# Patient Record
Sex: FEMALE | Race: WHITE | Employment: FULL TIME | URBAN - METROPOLITAN AREA
[De-identification: names, ages, dates, MRNs, and addresses within clinical notes are randomized per-mention and may not be internally consistent; named-entity substitution may affect disease eponyms.]

---

## 2017-08-30 ENCOUNTER — OFFICE VISIT (OUTPATIENT)
Dept: FAMILY MEDICINE CLINIC | Facility: CLINIC | Age: 37
End: 2017-08-30

## 2017-08-30 VITALS
DIASTOLIC BLOOD PRESSURE: 80 MMHG | HEIGHT: 62 IN | HEART RATE: 67 BPM | OXYGEN SATURATION: 97 % | WEIGHT: 145 LBS | RESPIRATION RATE: 14 BRPM | TEMPERATURE: 99 F | SYSTOLIC BLOOD PRESSURE: 122 MMHG | BODY MASS INDEX: 26.68 KG/M2

## 2017-08-30 DIAGNOSIS — L72.9 CYST OF SKIN: Primary | ICD-10-CM

## 2017-08-30 PROCEDURE — 99212 OFFICE O/P EST SF 10 MIN: CPT | Performed by: FAMILY MEDICINE

## 2017-08-30 NOTE — PROGRESS NOTES
HPI:    Patient ID: Clari Steward is a 31year old female. Patient presents with:  Lump    HPI  C/o pea size bump between breasts. Location: lower sternum, under bra line  Onset: 6 days ago. Resolved after warm compresses. Felt incidentally.  Was never supraclavicular adenopathy present. Skin: No rash noted. Vitals reviewed. Blood pressure 122/80, pulse 67, temperature 98.7 °F (37.1 °C), temperature source Temporal, resp.  rate 14, height 62\", weight 145 lb, last menstrual period 08/08/2017, SpO2

## 2017-11-13 ENCOUNTER — TELEPHONE (OUTPATIENT)
Dept: FAMILY MEDICINE CLINIC | Facility: CLINIC | Age: 37
End: 2017-11-13

## 2017-11-13 ENCOUNTER — OFFICE VISIT (OUTPATIENT)
Dept: FAMILY MEDICINE CLINIC | Facility: CLINIC | Age: 37
End: 2017-11-13

## 2017-11-13 VITALS
HEIGHT: 62 IN | BODY MASS INDEX: 27.23 KG/M2 | SYSTOLIC BLOOD PRESSURE: 122 MMHG | RESPIRATION RATE: 18 BRPM | DIASTOLIC BLOOD PRESSURE: 80 MMHG | WEIGHT: 148 LBS | HEART RATE: 80 BPM | TEMPERATURE: 98 F

## 2017-11-13 DIAGNOSIS — N63.0 BREAST MASS: Primary | ICD-10-CM

## 2017-11-13 DIAGNOSIS — N63.0 BREAST LUMP IN LOWER INNER QUADRANT: Primary | ICD-10-CM

## 2017-11-13 PROCEDURE — 99213 OFFICE O/P EST LOW 20 MIN: CPT | Performed by: FAMILY MEDICINE

## 2017-11-13 NOTE — TELEPHONE ENCOUNTER
Pt called, the cyst in her breast came back and pt would like to discuss with a nurse.    Please call pt at [phone]

## 2017-11-13 NOTE — TELEPHONE ENCOUNTER
Patient states she tried to schedule her u/s of breast.  Hunter Estrada from breast health told her an order for diagnostic bilateral mammogram would be needed before they can do an u/s. Ordered pended.   Please advise-  Patient would like a call back when this has

## 2017-11-13 NOTE — PROGRESS NOTES
HPI:    Patient ID: Nena Metz is a 30year old female. Patient presents with:  Bump: breast    HPI   Bump noticed by patient on right breast approx 10 days ago. No change in size. No drainage. Not painful. No skin changes.   Had similar bump over st hepatosplenomegaly. There is no tenderness. Lymphadenopathy:     She has no axillary adenopathy. Right: No supraclavicular adenopathy present. Left: No supraclavicular adenopathy present. Skin: No rash noted. Vitals reviewed.     Blood p

## 2017-11-13 NOTE — TELEPHONE ENCOUNTER
Patient states chest was sore on the 3rd. Patient states area was tender and bruised. Area was swollen - patient states it is no longer was swollen. Patient states she put ice on it. Patient states she has been icing it.   Patient states it is pea sized

## 2017-11-14 NOTE — TELEPHONE ENCOUNTER
Informed patient Dr. Ashutosh Franklin is not in the office today. Will call tomorrow once order has been placed. Patient verbalized understanding.

## 2017-11-14 NOTE — TELEPHONE ENCOUNTER
Pt called lvm stated she called yesterday about her diagnostic mammogram and did not hear anything back. Pt stated she needs to get this scheduled.

## 2017-11-21 ENCOUNTER — HOSPITAL ENCOUNTER (OUTPATIENT)
Dept: MAMMOGRAPHY | Age: 37
Discharge: HOME | End: 2017-11-21
Attending: FAMILY MEDICINE
Payer: COMMERCIAL

## 2017-11-21 ENCOUNTER — HOSPITAL ENCOUNTER (OUTPATIENT)
Dept: ULTRASOUND IMAGING | Age: 37
Discharge: HOME | End: 2017-11-21
Attending: FAMILY MEDICINE
Payer: COMMERCIAL

## 2017-11-21 DIAGNOSIS — N63.0 BREAST MASS: ICD-10-CM

## 2017-11-21 DIAGNOSIS — N63.0 BREAST LUMP IN LOWER INNER QUADRANT: ICD-10-CM

## 2017-11-21 PROCEDURE — 77062 BREAST TOMOSYNTHESIS BI: CPT | Performed by: FAMILY MEDICINE

## 2017-11-21 PROCEDURE — 76642 ULTRASOUND BREAST LIMITED: CPT | Performed by: FAMILY MEDICINE

## 2017-11-21 PROCEDURE — 77066 DX MAMMO INCL CAD BI: CPT | Performed by: FAMILY MEDICINE

## 2017-11-27 ENCOUNTER — TELEPHONE (OUTPATIENT)
Dept: FAMILY MEDICINE CLINIC | Facility: CLINIC | Age: 37
End: 2017-11-27

## 2017-11-27 NOTE — TELEPHONE ENCOUNTER
LMTCB to discuss results. Right breast u/s: In the area of palpable abnormality at the 3:00 position of the right breast is a small persistent nodular density.  At the targeted right breast sonography at the 3:00 position, 7 cm from the nipple, there is

## 2017-11-28 ENCOUNTER — NURSE ONLY (OUTPATIENT)
Dept: FAMILY MEDICINE CLINIC | Facility: CLINIC | Age: 37
End: 2017-11-28

## 2017-11-28 DIAGNOSIS — Z00.00 GENERAL MEDICAL EXAM: Primary | ICD-10-CM

## 2017-11-28 PROCEDURE — 82306 VITAMIN D 25 HYDROXY: CPT | Performed by: FAMILY MEDICINE

## 2017-11-28 PROCEDURE — 85025 COMPLETE CBC W/AUTO DIFF WBC: CPT | Performed by: FAMILY MEDICINE

## 2017-11-28 PROCEDURE — 80061 LIPID PANEL: CPT | Performed by: FAMILY MEDICINE

## 2017-11-28 PROCEDURE — 36415 COLL VENOUS BLD VENIPUNCTURE: CPT | Performed by: FAMILY MEDICINE

## 2017-11-28 PROCEDURE — 80053 COMPREHEN METABOLIC PANEL: CPT | Performed by: FAMILY MEDICINE

## 2017-11-28 PROCEDURE — 84443 ASSAY THYROID STIM HORMONE: CPT | Performed by: FAMILY MEDICINE

## 2017-12-01 ENCOUNTER — TELEPHONE (OUTPATIENT)
Dept: FAMILY MEDICINE CLINIC | Facility: CLINIC | Age: 37
End: 2017-12-01

## 2018-01-05 ENCOUNTER — OFFICE VISIT (OUTPATIENT)
Dept: FAMILY MEDICINE CLINIC | Facility: CLINIC | Age: 38
End: 2018-01-05

## 2018-01-05 VITALS
OXYGEN SATURATION: 98 % | HEART RATE: 80 BPM | WEIGHT: 147 LBS | TEMPERATURE: 98 F | BODY MASS INDEX: 25.72 KG/M2 | RESPIRATION RATE: 18 BRPM | SYSTOLIC BLOOD PRESSURE: 104 MMHG | HEIGHT: 63.2 IN | DIASTOLIC BLOOD PRESSURE: 70 MMHG

## 2018-01-05 DIAGNOSIS — Z00.00 ANNUAL PHYSICAL EXAM: Primary | ICD-10-CM

## 2018-01-05 DIAGNOSIS — L70.9 ADULT ACNE: ICD-10-CM

## 2018-01-05 DIAGNOSIS — Z23 NEED FOR VACCINATION: ICD-10-CM

## 2018-01-05 PROCEDURE — 90471 IMMUNIZATION ADMIN: CPT | Performed by: FAMILY MEDICINE

## 2018-01-05 PROCEDURE — 90686 IIV4 VACC NO PRSV 0.5 ML IM: CPT | Performed by: FAMILY MEDICINE

## 2018-01-05 PROCEDURE — 99395 PREV VISIT EST AGE 18-39: CPT | Performed by: FAMILY MEDICINE

## 2018-01-05 RX ORDER — CLINDAMYCIN AND BENZOYL PEROXIDE 10; 50 MG/G; MG/G
GEL TOPICAL
Qty: 45 G | Refills: 0 | Status: CANCELLED | OUTPATIENT
Start: 2018-01-05

## 2018-01-05 RX ORDER — MOMETASONE FUROATE 1 MG/G
CREAM TOPICAL
Qty: 50 G | Refills: 0 | Status: CANCELLED | OUTPATIENT
Start: 2018-01-05

## 2018-01-05 RX ORDER — SCOLOPAMINE TRANSDERMAL SYSTEM 1 MG/1
1 PATCH, EXTENDED RELEASE TRANSDERMAL
Qty: 4 PATCH | Refills: 0 | Status: SHIPPED | OUTPATIENT
Start: 2018-01-05 | End: 2019-05-17

## 2018-01-05 NOTE — PROGRESS NOTES
HPI:   Sherri Hernandez is a 30year old female who presents for a complete physical exam. Needs form for work filled out. No concerns today. UTD on pap. Needs referral for derm, Dr. Moises Gilliam, for acne and skin check.     Gets motion sickness and request Maternal Grandmother      breast cancer   • Breast Cancer Maternal Grandmother 39     approx age    • Heart Disease Neg    • Stroke Neg    • Diabetes Neg    • Heart Disorder Neg       Social History:   Smoking status: Never Smoker Derm referral placed. Motion sickness: scopolamine patch. Side effects and therapeutic benefits of medication reviewed. Patient expresses understanding. Pt' s weight is Body mass index is 25.88 kg/m². , continue regular exercise.   The patient indicates

## 2018-01-08 RX ORDER — CLINDAMYCIN AND BENZOYL PEROXIDE 10; 50 MG/G; MG/G
GEL TOPICAL
Qty: 45 G | Refills: 1 | Status: CANCELLED
Start: 2018-01-08

## 2018-01-08 RX ORDER — MOMETASONE FUROATE 1 MG/G
CREAM TOPICAL
Qty: 50 G | Refills: 0 | Status: CANCELLED
Start: 2018-01-08

## 2018-01-08 NOTE — TELEPHONE ENCOUNTER
LOV: 1/5/18 for annual physical  Patient has eczema and adult acne    Mometasone Furoate (ELOCON) 0.1 % Apply Externally Cream 50 g 0 9/5/2015    Sig :  Apply sparingly to affected area BID for 7 days     Route:   (none)       Clindamycin Phos-Benzoyl Dwain

## 2018-01-08 NOTE — TELEPHONE ENCOUNTER
From: Stefania Chahal  Sent: 1/8/2018 9:00 AM CST  Subject: Medication Renewal Request    Zahida Halsted.  Kinsey would like a refill of the following medications:     Mometasone Furoate (ELOCON) 0.1 % Apply Externally Cream Shashank Shin MD]     Clindamycin Phos

## 2019-02-01 ENCOUNTER — OFFICE VISIT (OUTPATIENT)
Dept: FAMILY MEDICINE CLINIC | Facility: CLINIC | Age: 39
End: 2019-02-01

## 2019-02-01 VITALS
RESPIRATION RATE: 16 BRPM | BODY MASS INDEX: 26.67 KG/M2 | SYSTOLIC BLOOD PRESSURE: 116 MMHG | WEIGHT: 148.63 LBS | HEIGHT: 62.5 IN | TEMPERATURE: 98 F | HEART RATE: 68 BPM | DIASTOLIC BLOOD PRESSURE: 80 MMHG

## 2019-02-01 DIAGNOSIS — K21.9 GASTROESOPHAGEAL REFLUX DISEASE, ESOPHAGITIS PRESENCE NOT SPECIFIED: Primary | ICD-10-CM

## 2019-02-01 LAB
BASOPHILS # BLD AUTO: 0.05 X10(3) UL (ref 0–0.2)
BASOPHILS NFR BLD AUTO: 0.6 %
DEPRECATED RDW RBC AUTO: 40.4 FL (ref 35.1–46.3)
EOSINOPHIL # BLD AUTO: 1.04 X10(3) UL (ref 0–0.7)
EOSINOPHIL NFR BLD AUTO: 12.6 %
ERYTHROCYTE [DISTWIDTH] IN BLOOD BY AUTOMATED COUNT: 12.4 % (ref 11–15)
HCT VFR BLD AUTO: 41 % (ref 35–48)
HGB BLD-MCNC: 13.2 G/DL (ref 12–16)
IMM GRANULOCYTES # BLD AUTO: 0.01 X10(3) UL (ref 0–1)
IMM GRANULOCYTES NFR BLD: 0.1 %
LYMPHOCYTES # BLD AUTO: 2 X10(3) UL (ref 1–4)
LYMPHOCYTES NFR BLD AUTO: 24.2 %
MCH RBC QN AUTO: 28.8 PG (ref 26–34)
MCHC RBC AUTO-ENTMCNC: 32.2 G/DL (ref 31–37)
MCV RBC AUTO: 89.5 FL (ref 80–100)
MONOCYTES # BLD AUTO: 0.5 X10(3) UL (ref 0.1–1)
MONOCYTES NFR BLD AUTO: 6.1 %
NEUTROPHILS # BLD AUTO: 4.66 X10 (3) UL (ref 1.5–7.7)
NEUTROPHILS # BLD AUTO: 4.66 X10(3) UL (ref 1.5–7.7)
NEUTROPHILS NFR BLD AUTO: 56.4 %
PLATELET # BLD AUTO: 204 10(3)UL (ref 150–450)
RBC # BLD AUTO: 4.58 X10(6)UL (ref 3.8–5.3)
WBC # BLD AUTO: 8.3 X10(3) UL (ref 4–11)

## 2019-02-01 PROCEDURE — 36415 COLL VENOUS BLD VENIPUNCTURE: CPT | Performed by: FAMILY MEDICINE

## 2019-02-01 PROCEDURE — 85025 COMPLETE CBC W/AUTO DIFF WBC: CPT | Performed by: FAMILY MEDICINE

## 2019-02-01 PROCEDURE — 99214 OFFICE O/P EST MOD 30 MIN: CPT | Performed by: FAMILY MEDICINE

## 2019-02-01 RX ORDER — OMEPRAZOLE 40 MG/1
40 CAPSULE, DELAYED RELEASE ORAL DAILY
Qty: 30 CAPSULE | Refills: 0 | Status: SHIPPED | OUTPATIENT
Start: 2019-02-01 | End: 2019-05-17

## 2019-02-01 NOTE — PROGRESS NOTES
HPI:    Patient ID: Jordyn Hill is a 41year old female. Patient presents with:  Stomach Pain    HPI  C/o epigastric pain for 1 week. Non-radiating. Sharp pain. Aggravated with all foods. Day and night. Acidic taste in mouth today. Also feel gasy.  No Vitals reviewed. Blood pressure 116/80, pulse 68, temperature 97.8 °F (36.6 °C), temperature source Temporal, resp. rate 16, height 62.5\", weight 148 lb 9.6 oz, last menstrual period 01/08/2019, not currently breastfeeding.              ASSESSMENT/JERRICA

## 2019-02-04 ENCOUNTER — TELEPHONE (OUTPATIENT)
Dept: FAMILY MEDICINE CLINIC | Facility: CLINIC | Age: 39
End: 2019-02-04

## 2019-03-31 ENCOUNTER — PATIENT MESSAGE (OUTPATIENT)
Dept: FAMILY MEDICINE CLINIC | Facility: CLINIC | Age: 39
End: 2019-03-31

## 2019-04-01 NOTE — TELEPHONE ENCOUNTER
Patient last seen 2/1/19 GERD  Medication prescribed and previously filled by Dr. Ruby Guerrero  Patient has not followed up with Dr. Ruby Guerrero and requesting Dr. Kraig Smith to refill. Medication pended - please advise if we can refill.

## 2019-04-01 NOTE — TELEPHONE ENCOUNTER
From: Sabina Ludwig  To: Bharath Moore MD  Sent: 3/31/2019 4:25 PM CDT  Subject: Prescription Question    I am in need of a refill of my Sulfacetamide Sodium (Acne) 10 % Lotn.  However since I have not seen Dr Arian Dahl yet for my annual check she is unable

## 2019-04-02 RX ORDER — SULFACETAMIDE SODIUM 100 MG/ML
LOTION TOPICAL
Qty: 118 ML | Refills: 0 | Status: SHIPPED | OUTPATIENT
Start: 2019-04-02 | End: 2019-05-17

## 2019-05-17 ENCOUNTER — OFFICE VISIT (OUTPATIENT)
Dept: FAMILY MEDICINE CLINIC | Facility: CLINIC | Age: 39
End: 2019-05-17

## 2019-05-17 VITALS
DIASTOLIC BLOOD PRESSURE: 62 MMHG | TEMPERATURE: 98 F | RESPIRATION RATE: 20 BRPM | OXYGEN SATURATION: 99 % | BODY MASS INDEX: 26.19 KG/M2 | HEART RATE: 80 BPM | WEIGHT: 146 LBS | SYSTOLIC BLOOD PRESSURE: 98 MMHG | HEIGHT: 62.5 IN

## 2019-05-17 DIAGNOSIS — L70.9 ACNE, UNSPECIFIED ACNE TYPE: Primary | ICD-10-CM

## 2019-05-17 DIAGNOSIS — Z12.4 PAP SMEAR FOR CERVICAL CANCER SCREENING: ICD-10-CM

## 2019-05-17 DIAGNOSIS — Z00.00 ANNUAL PHYSICAL EXAM: ICD-10-CM

## 2019-05-17 DIAGNOSIS — D22.9 ATYPICAL MOLE: ICD-10-CM

## 2019-05-17 PROCEDURE — 80053 COMPREHEN METABOLIC PANEL: CPT | Performed by: FAMILY MEDICINE

## 2019-05-17 PROCEDURE — 87624 HPV HI-RISK TYP POOLED RSLT: CPT | Performed by: FAMILY MEDICINE

## 2019-05-17 PROCEDURE — 88175 CYTOPATH C/V AUTO FLUID REDO: CPT | Performed by: FAMILY MEDICINE

## 2019-05-17 PROCEDURE — 83036 HEMOGLOBIN GLYCOSYLATED A1C: CPT | Performed by: FAMILY MEDICINE

## 2019-05-17 PROCEDURE — 80061 LIPID PANEL: CPT | Performed by: FAMILY MEDICINE

## 2019-05-17 PROCEDURE — 85025 COMPLETE CBC W/AUTO DIFF WBC: CPT | Performed by: FAMILY MEDICINE

## 2019-05-17 PROCEDURE — 84443 ASSAY THYROID STIM HORMONE: CPT | Performed by: FAMILY MEDICINE

## 2019-05-17 PROCEDURE — 99395 PREV VISIT EST AGE 18-39: CPT | Performed by: FAMILY MEDICINE

## 2019-05-17 RX ORDER — SCOLOPAMINE TRANSDERMAL SYSTEM 1 MG/1
1 PATCH, EXTENDED RELEASE TRANSDERMAL
Qty: 4 PATCH | Refills: 0 | Status: SHIPPED | OUTPATIENT
Start: 2019-05-17

## 2019-05-17 RX ORDER — SULFACETAMIDE SODIUM 100 MG/ML
LOTION TOPICAL
Qty: 118 ML | Refills: 0 | Status: SHIPPED | OUTPATIENT
Start: 2019-05-17 | End: 2020-01-02

## 2019-05-17 RX ORDER — MOMETASONE FUROATE 1 MG/G
CREAM TOPICAL
Qty: 50 G | Refills: 1 | Status: SHIPPED | OUTPATIENT
Start: 2019-05-17 | End: 2020-10-19

## 2019-05-17 NOTE — PROGRESS NOTES
HPI:   Danyell Jackson is a 41year old female who presents for a complete physical exam.   No concerns today. Needs form for work. Pap due    GERD sx from Feb resolved.     Wt Readings from Last 6 Encounters:  05/17/19 : 146 lb  02/01/19 : 148 lb 9.6 oz  0 Grandmother         breast cancer   • Breast Cancer Maternal Grandmother 39        approx age    • Heart Disease Neg    • Stroke Neg    • Diabetes Neg    • Heart Disorder Neg       Social History:   Social History    Tobacco Use      Smoking status: Former tenderness  :introitus is normal,scant discharge,cervix is pink,no adnexal masses or tenderness, PAP was done   MUSCULOSKELETAL: back is not tender  EXTREMITIES: no edema  NEURO: Cranial nerves are intact,motor and sensory are grossly intact  PSYCH: mood

## 2020-01-02 RX ORDER — SULFACETAMIDE SODIUM 100 MG/ML
LOTION TOPICAL
Qty: 118 ML | Refills: 0 | Status: SHIPPED | OUTPATIENT
Start: 2020-01-02 | End: 2021-11-12

## 2020-01-02 NOTE — TELEPHONE ENCOUNTER
Last refilled on 5/17/19 for # 118mL with 0 refills  Last OV 5/17/19  No future appointments. Thank you.

## 2020-03-18 ENCOUNTER — E-VISIT (OUTPATIENT)
Dept: FAMILY MEDICINE CLINIC | Facility: CLINIC | Age: 40
End: 2020-03-18

## 2020-03-18 DIAGNOSIS — J02.9 SORE THROAT: Primary | ICD-10-CM

## 2020-03-18 DIAGNOSIS — Z20.818 STREP THROAT EXPOSURE: ICD-10-CM

## 2020-03-18 RX ORDER — AMOXICILLIN 875 MG/1
875 TABLET, COATED ORAL 2 TIMES DAILY
Qty: 20 TABLET | Refills: 0 | Status: SHIPPED | OUTPATIENT
Start: 2020-03-18 | End: 2020-03-28

## 2020-03-18 NOTE — PROGRESS NOTES
Linda Orr is a 42year old female. HPI:   See answers to questions above. Current Outpatient Medications   Medication Sig Dispense Refill   • amoxicillin 875 MG Oral Tab Take 1 tablet (875 mg total) by mouth 2 (two) times daily for 10 days.  20 ta specific patient instructions

## 2020-05-18 ENCOUNTER — E-VISIT (OUTPATIENT)
Dept: FAMILY MEDICINE CLINIC | Facility: CLINIC | Age: 40
End: 2020-05-18

## 2020-05-18 DIAGNOSIS — K21.9 GASTROESOPHAGEAL REFLUX DISEASE, ESOPHAGITIS PRESENCE NOT SPECIFIED: Primary | ICD-10-CM

## 2020-05-18 PROCEDURE — 99421 OL DIG E/M SVC 5-10 MIN: CPT | Performed by: NURSE PRACTITIONER

## 2020-05-18 NOTE — PROGRESS NOTES
Clari Steward is a 42year old female. HPI:   See answers to questions above.      Current Outpatient Medications   Medication Sig Dispense Refill   • Sulfacetamide Sodium, Acne, 10 % External Lotion APPLY EXTERNALLY TO FACE EVERY MORNING 118 mL 0   • Mom

## 2020-10-19 RX ORDER — MOMETASONE FUROATE 1 MG/G
CREAM TOPICAL
Qty: 45 G | Refills: 0 | Status: SHIPPED | OUTPATIENT
Start: 2020-10-19 | End: 2021-11-12

## 2020-10-19 NOTE — TELEPHONE ENCOUNTER
LOV 5/17/19 for acne.     Future Appointments   Date Time Provider Odalys Mohan   10/21/2020  9:00 AM Miriam Keller MD [facility] [place]     Mometasone Furoate 0.1 % External Cream 50 g 1 5/17/2019    Sig:   Apply sparingly to affected area BID for 7

## 2020-10-21 ENCOUNTER — OFFICE VISIT (OUTPATIENT)
Dept: FAMILY MEDICINE CLINIC | Facility: CLINIC | Age: 40
End: 2020-10-21

## 2020-10-21 VITALS
HEART RATE: 64 BPM | DIASTOLIC BLOOD PRESSURE: 74 MMHG | HEIGHT: 63 IN | WEIGHT: 152 LBS | RESPIRATION RATE: 18 BRPM | OXYGEN SATURATION: 99 % | SYSTOLIC BLOOD PRESSURE: 116 MMHG | TEMPERATURE: 98 F | BODY MASS INDEX: 26.93 KG/M2

## 2020-10-21 DIAGNOSIS — Z80.0 FHX: COLON CANCER: ICD-10-CM

## 2020-10-21 DIAGNOSIS — Z23 NEED FOR VACCINATION: Primary | ICD-10-CM

## 2020-10-21 DIAGNOSIS — Z00.00 ANNUAL PHYSICAL EXAM: ICD-10-CM

## 2020-10-21 DIAGNOSIS — L70.9 ACNE, UNSPECIFIED ACNE TYPE: ICD-10-CM

## 2020-10-21 PROCEDURE — 90686 IIV4 VACC NO PRSV 0.5 ML IM: CPT | Performed by: FAMILY MEDICINE

## 2020-10-21 PROCEDURE — 80053 COMPREHEN METABOLIC PANEL: CPT | Performed by: FAMILY MEDICINE

## 2020-10-21 PROCEDURE — 85025 COMPLETE CBC W/AUTO DIFF WBC: CPT | Performed by: FAMILY MEDICINE

## 2020-10-21 PROCEDURE — 99395 PREV VISIT EST AGE 18-39: CPT | Performed by: FAMILY MEDICINE

## 2020-10-21 PROCEDURE — 83036 HEMOGLOBIN GLYCOSYLATED A1C: CPT | Performed by: FAMILY MEDICINE

## 2020-10-21 PROCEDURE — 3008F BODY MASS INDEX DOCD: CPT | Performed by: FAMILY MEDICINE

## 2020-10-21 PROCEDURE — 80061 LIPID PANEL: CPT | Performed by: FAMILY MEDICINE

## 2020-10-21 PROCEDURE — 3078F DIAST BP <80 MM HG: CPT | Performed by: FAMILY MEDICINE

## 2020-10-21 PROCEDURE — 90471 IMMUNIZATION ADMIN: CPT | Performed by: FAMILY MEDICINE

## 2020-10-21 PROCEDURE — 3074F SYST BP LT 130 MM HG: CPT | Performed by: FAMILY MEDICINE

## 2020-10-21 PROCEDURE — 84443 ASSAY THYROID STIM HORMONE: CPT | Performed by: FAMILY MEDICINE

## 2020-10-21 NOTE — PROGRESS NOTES
HPI:   Ewelina Trammell is a 42year old female who presents for a complete physical exam.  No concerns today. Needs form for work.   Pap: UTD  Recently found out GM had colon cancer in 45s    Wt Readings from Last 6 Encounters:  10/21/20 : 152 lb (68.9 kg) History   Problem Relation Age of Onset   • Hypertension Maternal Grandmother    • Lipids Maternal Grandmother    • Cancer Maternal Grandmother         breast cancer   • Breast Cancer Maternal Grandmother 39        approx age    • Heart Disease Neg    • St clear to auscultation  CARDIO: RRR without murmur  GI: soft, good BS's,no masses, HSM or tenderness  MUSCULOSKELETAL: back is not tender  EXTREMITIES: no edema  NEURO: Cranial nerves are intact,motor and sensory are grossly intact  PSYCH: mood, thought, be

## 2020-12-06 ENCOUNTER — APPOINTMENT (OUTPATIENT)
Dept: LAB | Facility: HOSPITAL | Age: 40
End: 2020-12-06
Attending: INTERNAL MEDICINE
Payer: COMMERCIAL

## 2020-12-06 DIAGNOSIS — Z01.818 PRE-OP TESTING: ICD-10-CM

## 2020-12-09 PROBLEM — R19.4 CHANGE IN BOWEL HABITS: Status: ACTIVE | Noted: 2020-12-09

## 2020-12-09 PROBLEM — R10.13 ABDOMINAL PAIN, EPIGASTRIC: Status: ACTIVE | Noted: 2020-12-09

## 2020-12-09 PROBLEM — R68.81 EARLY SATIETY: Status: ACTIVE | Noted: 2020-12-09

## 2020-12-09 PROBLEM — R14.1 ABDOMINAL GAS PAIN: Status: ACTIVE | Noted: 2020-12-09

## 2020-12-09 PROCEDURE — 88305 TISSUE EXAM BY PATHOLOGIST: CPT | Performed by: INTERNAL MEDICINE

## 2021-01-04 RX ORDER — MOMETASONE FUROATE 1 MG/G
CREAM TOPICAL
Qty: 45 G | Refills: 0 | OUTPATIENT
Start: 2021-01-04

## 2021-04-01 DIAGNOSIS — Z23 NEED FOR VACCINATION: ICD-10-CM

## 2021-06-08 NOTE — PROGRESS NOTES
"SPIRITUAL HEALTH SERVICES  SPIRITUAL ASSESSMENT Progress Note  [facility]     REFERRAL SOURCE: Pt request at Admission    I briefly visited Aspen today as there was a request for a spiritual health visit. Aspen shared she didn't request this and shared \"I am one of Jehovah's witnesses.\" She declined any needs at this time and I offered words of support.     PLAN: No plans for follow up but spiritual health remains available if Aspen's needs change.     Roseanne Alvarado  Associate    Phone: [phone]  Pager: [phone]    " Med refilled.

## 2021-09-08 ENCOUNTER — PATIENT MESSAGE (OUTPATIENT)
Dept: FAMILY MEDICINE CLINIC | Facility: CLINIC | Age: 41
End: 2021-09-08

## 2021-09-09 NOTE — TELEPHONE ENCOUNTER
From: Unknown Diana  To: Zachary Quintero MD  Sent: 9/8/2021 7:21 PM CDT  Subject: Non-Urgent Medical Question    Good evening. Since 2016 I have experienced intermittent spotting between periods, mostly correlating with ovulation.  I had a ultrasound in 201

## 2021-09-09 NOTE — TELEPHONE ENCOUNTER
Future Appointments   Date Time Provider Odalys Mohan   9/15/2021  1:30 PM Shahram Henley MD [facility] [place]     Wait for OV so approp test can be ordered after eval

## 2021-09-14 ENCOUNTER — PATIENT MESSAGE (OUTPATIENT)
Dept: FAMILY MEDICINE CLINIC | Facility: CLINIC | Age: 41
End: 2021-09-14

## 2021-09-15 ENCOUNTER — OFFICE VISIT (OUTPATIENT)
Dept: FAMILY MEDICINE CLINIC | Facility: CLINIC | Age: 41
End: 2021-09-15

## 2021-09-15 VITALS
HEART RATE: 67 BPM | BODY MASS INDEX: 27.6 KG/M2 | SYSTOLIC BLOOD PRESSURE: 102 MMHG | TEMPERATURE: 98 F | HEIGHT: 62 IN | RESPIRATION RATE: 18 BRPM | WEIGHT: 150 LBS | DIASTOLIC BLOOD PRESSURE: 60 MMHG | OXYGEN SATURATION: 95 %

## 2021-09-15 DIAGNOSIS — N92.3 INTERMENSTRUAL SPOTTING: Primary | ICD-10-CM

## 2021-09-15 LAB
BASOPHILS # BLD AUTO: 0.03 X10(3) UL (ref 0–0.2)
BASOPHILS NFR BLD AUTO: 0.5 %
EOSINOPHIL # BLD AUTO: 0.06 X10(3) UL (ref 0–0.7)
EOSINOPHIL NFR BLD AUTO: 0.9 %
ERYTHROCYTE [DISTWIDTH] IN BLOOD BY AUTOMATED COUNT: 13.1 %
HCT VFR BLD AUTO: 42.2 %
HGB BLD-MCNC: 13.3 G/DL
IMM GRANULOCYTES # BLD AUTO: 0.01 X10(3) UL (ref 0–1)
IMM GRANULOCYTES NFR BLD: 0.2 %
LYMPHOCYTES # BLD AUTO: 2.06 X10(3) UL (ref 1–4)
LYMPHOCYTES NFR BLD AUTO: 32 %
MCH RBC QN AUTO: 27.7 PG (ref 26–34)
MCHC RBC AUTO-ENTMCNC: 31.5 G/DL (ref 31–37)
MCV RBC AUTO: 87.7 FL
MONOCYTES # BLD AUTO: 0.41 X10(3) UL (ref 0.1–1)
MONOCYTES NFR BLD AUTO: 6.4 %
NEUTROPHILS # BLD AUTO: 3.87 X10 (3) UL (ref 1.5–7.7)
NEUTROPHILS # BLD AUTO: 3.87 X10(3) UL (ref 1.5–7.7)
NEUTROPHILS NFR BLD AUTO: 60 %
PLATELET # BLD AUTO: 233 10(3)UL (ref 150–450)
RBC # BLD AUTO: 4.81 X10(6)UL
WBC # BLD AUTO: 6.4 X10(3) UL (ref 4–11)

## 2021-09-15 PROCEDURE — 85025 COMPLETE CBC W/AUTO DIFF WBC: CPT | Performed by: FAMILY MEDICINE

## 2021-09-15 PROCEDURE — 3074F SYST BP LT 130 MM HG: CPT | Performed by: FAMILY MEDICINE

## 2021-09-15 PROCEDURE — 3078F DIAST BP <80 MM HG: CPT | Performed by: FAMILY MEDICINE

## 2021-09-15 PROCEDURE — 99214 OFFICE O/P EST MOD 30 MIN: CPT | Performed by: FAMILY MEDICINE

## 2021-09-15 PROCEDURE — 3008F BODY MASS INDEX DOCD: CPT | Performed by: FAMILY MEDICINE

## 2021-09-15 NOTE — TELEPHONE ENCOUNTER
Future Appointments   Date Time Provider Odalys Kimberlee   9/15/2021  1:30 PM Elena Gonzalez MD [facility]

## 2021-09-15 NOTE — PROGRESS NOTES
Patient presents with:  Irregular Periods: spotting between period        HPI:    Patient ID: Lisa Shea is a 47year old female intermenstrual spotting since 2016.  2 days spotting mid-cycle . Periods reg. Lasts 7 days. Last 3 days very light.   Cycle breast cancer (45s), colon ca (45s)   • Breast Cancer Maternal Grandmother 39        approx age    • Colon Cancer Maternal Grandmother         Had partial resection in 45s   • Crohn's Disease Sister         Sister has a history of IBS   • Heart Disease Neg

## 2021-09-15 NOTE — TELEPHONE ENCOUNTER
From: Catalino Gray  To: Lucy Flanagan MD  Sent: 9/14/2021 7:55 PM CDT  Subject: Confirm ok for telehealth visit     Can you please review the previous correspondence and let me know if a telehealth visit is still ok for tomorrow, or if I need to resched

## 2021-10-06 ENCOUNTER — HOSPITAL ENCOUNTER (OUTPATIENT)
Dept: ULTRASOUND IMAGING | Age: 41
Discharge: HOME | End: 2021-10-06
Attending: FAMILY MEDICINE
Payer: COMMERCIAL

## 2021-10-06 ENCOUNTER — PATIENT MESSAGE (OUTPATIENT)
Dept: FAMILY MEDICINE CLINIC | Facility: CLINIC | Age: 41
End: 2021-10-06

## 2021-10-06 DIAGNOSIS — N92.3 INTERMENSTRUAL SPOTTING: ICD-10-CM

## 2021-10-06 PROCEDURE — 76830 TRANSVAGINAL US NON-OB: CPT | Performed by: FAMILY MEDICINE

## 2021-10-06 PROCEDURE — 76856 US EXAM PELVIC COMPLETE: CPT | Performed by: FAMILY MEDICINE

## 2021-10-06 NOTE — TELEPHONE ENCOUNTER
From: Devon [place]  To: Lilliana Zamarripa MD  Sent: 10/6/2021 2:08 PM CDT  Subject: Question regarding Ultrasound Scan Pelvis    The US was completed but it does not make mention of the nabothian cyst on the cervix which was present previously and still pal

## 2021-10-08 ENCOUNTER — TELEPHONE (OUTPATIENT)
Dept: FAMILY MEDICINE CLINIC | Facility: CLINIC | Age: 41
End: 2021-10-08

## 2021-10-08 NOTE — TELEPHONE ENCOUNTER
Spoke with the pt and advised of the ultrasound results and recommendations she states that she has an appt with Gyne next week  Advised that they have the same computer system and they will be able to see the ultrasound results    She v/u

## 2021-10-08 NOTE — TELEPHONE ENCOUNTER
----- Message from Jeff Saucedo MD sent at 10/7/2021 11:04 PM CDT -----  Ultrasound grossly nl. No obvious cause for intermenstrual bleeding seen. Rec f/u with gyne.  If pt does not have one, refer to [facility] gyne

## 2021-10-11 PROBLEM — N92.3 INTERMENSTRUAL BLEEDING: Status: ACTIVE | Noted: 2021-10-11

## 2021-10-12 ENCOUNTER — OFFICE VISIT (OUTPATIENT)
Dept: OBGYN CLINIC | Facility: CLINIC | Age: 41
End: 2021-10-12

## 2021-10-12 VITALS — HEIGHT: 63.5 IN | BODY MASS INDEX: 26.77 KG/M2 | WEIGHT: 153 LBS

## 2021-10-12 DIAGNOSIS — N92.3 INTERMENSTRUAL BLEEDING: Primary | ICD-10-CM

## 2021-10-12 PROCEDURE — 99243 OFF/OP CNSLTJ NEW/EST LOW 30: CPT | Performed by: OBSTETRICS & GYNECOLOGY

## 2021-10-12 PROCEDURE — 3008F BODY MASS INDEX DOCD: CPT | Performed by: OBSTETRICS & GYNECOLOGY

## 2021-10-12 NOTE — PROGRESS NOTES
[facility]  Obstetrics and Gynecology  Consultation History & Physical    [facility] Patient Status:  No patient class for patient encounter    10/30/1980 MRN [id number]   Location [address] [facility], [address] Attending No a Past Surgical History:   Procedure Laterality Date   •       x2   • MINH BIOPSY STEREO NODULE 1 SITE RIGHT (CPT=19081)  ? benign       Review of Systems:  Pertinent items are noted in the HPI.     Objective:  Ht 63.5\"   Wt 153 lb (69.4 OK with her current bleeding pattern. Recommend office hysteroscopy and endometrial biopsy, as suspicion for pathology is low.   If office hysteroscopy reveals any abnormalities, would need outpatient hysteroscopy to perform endometrial polypectomy and D&C

## 2021-10-27 ENCOUNTER — TELEPHONE (OUTPATIENT)
Dept: FAMILY MEDICINE CLINIC | Facility: CLINIC | Age: 41
End: 2021-10-27

## 2021-10-27 DIAGNOSIS — Z00.00 ANNUAL PHYSICAL EXAM: Primary | ICD-10-CM

## 2021-10-27 NOTE — TELEPHONE ENCOUNTER
Pt wants to have labs done before her px appt 11/10/21,  States they are the same ones every year. Please call pt when orders are placed.      Future Appointments   Date Time Provider Odalys Kimberlee   11/10/2021  3:00 PM Zee Gale MD [facility]

## 2021-11-04 ENCOUNTER — TELEPHONE (OUTPATIENT)
Dept: FAMILY MEDICINE CLINIC | Facility: CLINIC | Age: 41
End: 2021-11-04

## 2021-11-04 NOTE — TELEPHONE ENCOUNTER
Pt scheduled an appt tomorrow with dr Armando Joshua for her annual health screening. Pt needs this before 11/15, pt made for 15 min. Is this ok, or does she need to schedule another appt?   Pt states she just needs her weight her waist measurement and the form mellisa

## 2021-11-04 NOTE — TELEPHONE ENCOUNTER
Spoke with the pt and she was very nice- we were able to get her in the schedule for next week.  She thanked me for the call and getting her in sooner      Future Appointments   Date Time Provider Odalys Mohan   11/11/2021  8:15 AM [facility] [place] NURSE [facility]

## 2021-11-04 NOTE — TELEPHONE ENCOUNTER
Pt stated that if not okay she would like to get just her labs done. Informed pt that the RN are booked for tomorrow but we can see if they can fit you in. Scheduled a RN appt just in case it is not.

## 2021-11-04 NOTE — TELEPHONE ENCOUNTER
Patient scheduled for 15 min px tomorrow with Dr Suzanne Santana. States needs this and health form filled out by 11/15-if this will not work can she just have labs done?  also scheduled her for a nurse visit to have labs. Please advise.  Dr Lulú Jasmine also ful

## 2021-11-11 ENCOUNTER — NURSE ONLY (OUTPATIENT)
Dept: FAMILY MEDICINE CLINIC | Facility: CLINIC | Age: 41
End: 2021-11-11

## 2021-11-11 DIAGNOSIS — Z00.00 ANNUAL PHYSICAL EXAM: ICD-10-CM

## 2021-11-11 PROCEDURE — 85025 COMPLETE CBC W/AUTO DIFF WBC: CPT | Performed by: FAMILY MEDICINE

## 2021-11-11 PROCEDURE — 80053 COMPREHEN METABOLIC PANEL: CPT | Performed by: FAMILY MEDICINE

## 2021-11-11 PROCEDURE — 83036 HEMOGLOBIN GLYCOSYLATED A1C: CPT | Performed by: FAMILY MEDICINE

## 2021-11-11 PROCEDURE — 84443 ASSAY THYROID STIM HORMONE: CPT | Performed by: FAMILY MEDICINE

## 2021-11-11 PROCEDURE — 80061 LIPID PANEL: CPT | Performed by: FAMILY MEDICINE

## 2021-11-11 NOTE — PROGRESS NOTES
Lizzette Duran presents today for nurse visit. Labs ordered by Dr Manda Smith. 1 light green, 1 lavender drawn from right ac area with 1 attempt with straight needle. Patient tolerated well. Left office in stable condition.

## 2021-11-12 ENCOUNTER — TELEPHONE (OUTPATIENT)
Dept: OBGYN CLINIC | Facility: CLINIC | Age: 41
End: 2021-11-12

## 2021-11-12 ENCOUNTER — OFFICE VISIT (OUTPATIENT)
Dept: FAMILY MEDICINE CLINIC | Facility: CLINIC | Age: 41
End: 2021-11-12

## 2021-11-12 VITALS
SYSTOLIC BLOOD PRESSURE: 114 MMHG | OXYGEN SATURATION: 99 % | WEIGHT: 151 LBS | TEMPERATURE: 98 F | HEART RATE: 79 BPM | RESPIRATION RATE: 19 BRPM | DIASTOLIC BLOOD PRESSURE: 80 MMHG | HEIGHT: 63.2 IN | BODY MASS INDEX: 26.42 KG/M2

## 2021-11-12 DIAGNOSIS — L70.9 ADULT ACNE: ICD-10-CM

## 2021-11-12 DIAGNOSIS — Z00.00 ANNUAL PHYSICAL EXAM: Primary | ICD-10-CM

## 2021-11-12 DIAGNOSIS — L30.9 ECZEMA, UNSPECIFIED TYPE: ICD-10-CM

## 2021-11-12 DIAGNOSIS — Z12.31 BREAST CANCER SCREENING BY MAMMOGRAM: ICD-10-CM

## 2021-11-12 PROCEDURE — 3074F SYST BP LT 130 MM HG: CPT | Performed by: FAMILY MEDICINE

## 2021-11-12 PROCEDURE — 3008F BODY MASS INDEX DOCD: CPT | Performed by: FAMILY MEDICINE

## 2021-11-12 PROCEDURE — 3079F DIAST BP 80-89 MM HG: CPT | Performed by: FAMILY MEDICINE

## 2021-11-12 PROCEDURE — 99395 PREV VISIT EST AGE 18-39: CPT | Performed by: FAMILY MEDICINE

## 2021-11-12 RX ORDER — MOMETASONE FUROATE 1 MG/G
CREAM TOPICAL
Qty: 45 G | Refills: 0 | Status: SHIPPED | OUTPATIENT
Start: 2021-11-12

## 2021-11-12 RX ORDER — SULFACETAMIDE SODIUM 100 MG/ML
LOTION TOPICAL
Qty: 118 ML | Refills: 2 | Status: SHIPPED | OUTPATIENT
Start: 2021-11-12

## 2021-11-12 NOTE — TELEPHONE ENCOUNTER
Per review of chart, patient seen in office on 10/12/21. Dr. Isabel Roca recommended in office hysteroscopy and EMB. Will have staff call patient to schedule.

## 2021-11-12 NOTE — PROGRESS NOTES
HPI:   Jordyn Hill is a 46year old female who presents for a complete physical exam.  Needs form for work filled out.     Seeing GYN for Pap, pelvic, breast exams  Has procedure coming up to evaluate intermenstrual bleeding    Has history of eczema  [place] 10/21/2020         Current Outpatient Medications   Medication Sig Dispense Refill   • Sulfacetamide Sodium, Acne, 10 % External Lotion APPLY EXTERNALLY TO FACE EVERY MORNING 118 mL 2   • Mometasone Furoate 0.1 % External Cream APPLY SPARINGLY EXTERNALLY denies any unusual skin lesions. Sees derm for mole check and acne.   EYES:denies blurred vision or double vision  HEENT: denies nasal congestion, sinus pain or ST  LUNGS: denies shortness of breath or cough  CARDIOVASCULAR: denies chest pain or palpitation

## 2021-11-12 NOTE — TELEPHONE ENCOUNTER
Patient came in the office today to let us know that its been over a week and she still has not received a phone call to schedule her appt for hysteroscopy and EMB.

## 2021-12-14 ENCOUNTER — NURSE ONLY (OUTPATIENT)
Dept: FAMILY MEDICINE CLINIC | Facility: CLINIC | Age: 41
End: 2021-12-14

## 2021-12-14 DIAGNOSIS — R79.89 ELEVATED SERUM CREATININE: ICD-10-CM

## 2021-12-14 PROCEDURE — 80048 BASIC METABOLIC PNL TOTAL CA: CPT | Performed by: FAMILY MEDICINE

## 2021-12-14 NOTE — PROGRESS NOTES
Tenzin Restrepo presents today for nurse visit. Labs ordered by Dr Kim Roth. 1 green drawn from right ac area with 1 attempt with straight needle. Patient tolerated well. Left office in stable condition.

## 2021-12-15 ENCOUNTER — TELEPHONE (OUTPATIENT)
Dept: FAMILY MEDICINE CLINIC | Facility: CLINIC | Age: 41
End: 2021-12-15

## 2021-12-15 NOTE — TELEPHONE ENCOUNTER
----- Message from Tressa Gutierrez MD sent at 12/14/2021  8:51 PM CST -----  Creat stable. Has not increased .   Repeat in 1 yr

## 2022-01-04 NOTE — TELEPHONE ENCOUNTER
TC to patient. Unable to offer office hysteroscopy at this time. Given option of endometrial biopsy and expectant management, but patient interested in [address] IUD, so recommend outpatient hysteroscopy, D&C and possible endometrial polypectomy, myomectomy at [facility] with placement of IUD at time of procedure. Recommend hysteroscopy, D&C, and possible endometrial polypectomy/myomectomy. Risks, benefits and potential complications reviewed, including anesthesia, infection with need for intravenous antibiotics, bleeding with need for blood transfusion, scarring, uterine perforation with need for hospitalization or additional surgery and fluid overload/electrolyte imbalances. All questions answered and patient agrees to the  proposed surgery without reservation. Risks, benefits, side effects and potential complications of Mirena IUD including expulsion, perforation with need for surgical removal, infection with potential effects on future fertility and need for intravenous antibiotics, and failure with increased risk of ectopic pregnancy reviewed and all questions answered.  pamphlet given.

## 2022-01-21 ENCOUNTER — TELEPHONE (OUTPATIENT)
Dept: OBGYN CLINIC | Facility: CLINIC | Age: 42
End: 2022-01-21

## 2022-01-21 NOTE — TELEPHONE ENCOUNTER
Returned call to patient. She is going to go on vacation in March. Wants to start birth control because she is due to get menses on 3/25 when she is supposed to be on vacation. Advised that this might not be a guaranteed way of stopping/delaying her menses. Advised that new birth control pills can cause irregular bleeding in the first 3-4 packs while your body becomes used to the pills. Will route to Dr. Mehnaz Varela to see if he has any recommendations and will call patient back with response.

## 2022-01-21 NOTE — TELEPHONE ENCOUNTER
TC to patient. Discussed irregular BTB first 1-3 months, age and increased risk VTE with long distance travel. [place]. Advise against starting OCP in this situation.

## 2022-01-21 NOTE — TELEPHONE ENCOUNTER
Patient states that she is getting the IUD Mirena. She would like to go on the pill just to stop her cycle for a vacation.  Please call to advise

## 2022-01-21 NOTE — TELEPHONE ENCOUNTER
----- Message from Gavin De Leon MD sent at 1/4/2022 12:10 PM CST -----  Surgeon: Dr. Gavin De Leon    Date:     Assistant: na    Type of Admit/Expected Discharge Department: Outpatient/Same Day    LOS: 0    Procedure Location: Main OR or [place] [id number] Dx:  Intermenstrual bleeding    Procedure: Hysteroscopy, dilation and curettage with Truclear or Myosure, possible endometrial polypectomy/myomectomy, placement of Mirena IUD    Anticipated Time:  45 min    Anesthesia: Choice    Special Equipment/Comments: Mirena IUD    Antibiotics: Initiate antibiotics per [facility] adult preoperative prophylactic antibiotic protocol     Pre Op Orders: Initiate my Pre-op standing orders, if none exist please use [address] Standing Order     Labs: Per [place]    Medical clearance: No

## 2022-01-21 NOTE — TELEPHONE ENCOUNTER
Surgery scheduled 2/19/22 at 130P  Post op   Future Appointments   Date Time Provider Odalys Kimberlee   3/1/2022 10:00 AM Kaimlla Aranda MD [facility] [place]     Orders entered  Added to calendar  Sent email for IUD  PA - submitted referral

## 2022-02-16 ENCOUNTER — LAB ENCOUNTER (OUTPATIENT)
Dept: LAB | Age: 42
End: 2022-02-16
Attending: OBSTETRICS & GYNECOLOGY
Payer: COMMERCIAL

## 2022-02-16 DIAGNOSIS — N92.3 INTERMENSTRUAL BLEEDING: ICD-10-CM

## 2022-02-16 LAB
ERYTHROCYTE [DISTWIDTH] IN BLOOD BY AUTOMATED COUNT: 12.6 %
HCT VFR BLD AUTO: 41.7 %
HGB BLD-MCNC: 13.4 G/DL
MCH RBC QN AUTO: 28.6 PG (ref 26–34)
MCHC RBC AUTO-ENTMCNC: 32.1 G/DL (ref 31–37)
MCV RBC AUTO: 89.1 FL
PLATELET # BLD AUTO: 201 10(3)UL (ref 150–450)
RBC # BLD AUTO: 4.68 X10(6)UL
SARS-COV-2 RNA RESP QL NAA+PROBE: NOT DETECTED
WBC # BLD AUTO: 5.7 X10(3) UL (ref 4–11)

## 2022-02-16 PROCEDURE — 85027 COMPLETE CBC AUTOMATED: CPT

## 2022-02-16 PROCEDURE — 36415 COLL VENOUS BLD VENIPUNCTURE: CPT

## 2022-02-19 ENCOUNTER — ANESTHESIA (OUTPATIENT)
Dept: SURGERY | Facility: HOSPITAL | Age: 42
End: 2022-02-19
Payer: COMMERCIAL

## 2022-02-19 ENCOUNTER — ANESTHESIA EVENT (OUTPATIENT)
Dept: SURGERY | Facility: HOSPITAL | Age: 42
End: 2022-02-19
Payer: COMMERCIAL

## 2022-02-19 ENCOUNTER — HOSPITAL ENCOUNTER (OUTPATIENT)
Facility: HOSPITAL | Age: 42
Setting detail: HOSPITAL OUTPATIENT SURGERY
Discharge: HOME | End: 2022-02-19
Attending: OBSTETRICS & GYNECOLOGY | Admitting: OBSTETRICS & GYNECOLOGY
Payer: COMMERCIAL

## 2022-02-19 VITALS
SYSTOLIC BLOOD PRESSURE: 105 MMHG | BODY MASS INDEX: 26.58 KG/M2 | HEIGHT: 63 IN | WEIGHT: 150 LBS | RESPIRATION RATE: 16 BRPM | DIASTOLIC BLOOD PRESSURE: 59 MMHG | HEART RATE: 59 BPM | TEMPERATURE: 98 F | OXYGEN SATURATION: 100 %

## 2022-02-19 DIAGNOSIS — N92.3 INTERMENSTRUAL BLEEDING: Primary | ICD-10-CM

## 2022-02-19 PROBLEM — Z97.5 IUD (INTRAUTERINE DEVICE) IN PLACE: Status: ACTIVE | Noted: 2022-02-19

## 2022-02-19 LAB — B-HCG UR QL: NEGATIVE

## 2022-02-19 PROCEDURE — 58558 HYSTEROSCOPY BIOPSY: CPT | Performed by: OBSTETRICS & GYNECOLOGY

## 2022-02-19 PROCEDURE — 58300 INSERT INTRAUTERINE DEVICE: CPT | Performed by: OBSTETRICS & GYNECOLOGY

## 2022-02-19 PROCEDURE — 0UH98HZ INSERTION OF CONTRACEPTIVE DEVICE INTO UTERUS, VIA NATURAL OR ARTIFICIAL OPENING ENDOSCOPIC: ICD-10-PCS | Performed by: OBSTETRICS & GYNECOLOGY

## 2022-02-19 PROCEDURE — 0UB98ZX EXCISION OF UTERUS, VIA NATURAL OR ARTIFICIAL OPENING ENDOSCOPIC, DIAGNOSTIC: ICD-10-PCS | Performed by: OBSTETRICS & GYNECOLOGY

## 2022-02-19 PROCEDURE — 0UDB8ZX EXTRACTION OF ENDOMETRIUM, VIA NATURAL OR ARTIFICIAL OPENING ENDOSCOPIC, DIAGNOSTIC: ICD-10-PCS | Performed by: OBSTETRICS & GYNECOLOGY

## 2022-02-19 RX ORDER — ACETAMINOPHEN 500 MG
1000 TABLET ORAL ONCE
Status: DISCONTINUED | OUTPATIENT
Start: 2022-02-19 | End: 2022-02-19 | Stop reason: HOSPADM

## 2022-02-19 RX ORDER — SODIUM CHLORIDE, SODIUM LACTATE, POTASSIUM CHLORIDE, CALCIUM CHLORIDE 600; 310; 30; 20 MG/100ML; MG/100ML; MG/100ML; MG/100ML
INJECTION, SOLUTION INTRAVENOUS CONTINUOUS
Status: DISCONTINUED | OUTPATIENT
Start: 2022-02-19 | End: 2022-02-19

## 2022-02-19 RX ORDER — LIDOCAINE HYDROCHLORIDE 10 MG/ML
INJECTION, SOLUTION EPIDURAL; INFILTRATION; INTRACAUDAL; PERINEURAL AS NEEDED
Status: DISCONTINUED | OUTPATIENT
Start: 2022-02-19 | End: 2022-02-19 | Stop reason: SURG

## 2022-02-19 RX ORDER — HYDROMORPHONE HYDROCHLORIDE 1 MG/ML
0.4 INJECTION, SOLUTION INTRAMUSCULAR; INTRAVENOUS; SUBCUTANEOUS EVERY 5 MIN PRN
Status: DISCONTINUED | OUTPATIENT
Start: 2022-02-19 | End: 2022-02-19

## 2022-02-19 RX ORDER — NALOXONE HYDROCHLORIDE 0.4 MG/ML
80 INJECTION, SOLUTION INTRAMUSCULAR; INTRAVENOUS; SUBCUTANEOUS AS NEEDED
Status: DISCONTINUED | OUTPATIENT
Start: 2022-02-19 | End: 2022-02-19

## 2022-02-19 RX ORDER — KETAMINE HYDROCHLORIDE 50 MG/ML
INJECTION, SOLUTION, CONCENTRATE INTRAMUSCULAR; INTRAVENOUS AS NEEDED
Status: DISCONTINUED | OUTPATIENT
Start: 2022-02-19 | End: 2022-02-19 | Stop reason: SURG

## 2022-02-19 RX ORDER — ONDANSETRON 2 MG/ML
4 INJECTION INTRAMUSCULAR; INTRAVENOUS AS NEEDED
Status: DISCONTINUED | OUTPATIENT
Start: 2022-02-19 | End: 2022-02-19

## 2022-02-19 RX ORDER — HYDROCODONE BITARTRATE AND ACETAMINOPHEN 5; 325 MG/1; MG/1
1 TABLET ORAL AS NEEDED
Status: DISCONTINUED | OUTPATIENT
Start: 2022-02-19 | End: 2022-02-19

## 2022-02-19 RX ORDER — HYDROCODONE BITARTRATE AND ACETAMINOPHEN 5; 325 MG/1; MG/1
2 TABLET ORAL AS NEEDED
Status: DISCONTINUED | OUTPATIENT
Start: 2022-02-19 | End: 2022-02-19

## 2022-02-19 RX ADMIN — SODIUM CHLORIDE, SODIUM LACTATE, POTASSIUM CHLORIDE, CALCIUM CHLORIDE: 600; 310; 30; 20 INJECTION, SOLUTION INTRAVENOUS at 14:37:00

## 2022-02-19 RX ADMIN — LIDOCAINE HYDROCHLORIDE 50 MG: 10 INJECTION, SOLUTION EPIDURAL; INFILTRATION; INTRACAUDAL; PERINEURAL at 14:31:00

## 2022-02-19 RX ADMIN — LIDOCAINE HYDROCHLORIDE 50 MG: 10 INJECTION, SOLUTION EPIDURAL; INFILTRATION; INTRACAUDAL; PERINEURAL at 14:00:00

## 2022-02-19 RX ADMIN — KETAMINE HYDROCHLORIDE 25 MG: 50 INJECTION, SOLUTION, CONCENTRATE INTRAMUSCULAR; INTRAVENOUS at 14:00:00

## 2022-02-19 NOTE — INTERVAL H&P NOTE
Pre-op Diagnosis: Intermenstrual bleeding [N92.3]    The above referenced H&P was reviewed by Ayaz Guerra MD on 2/19/2022, the patient was examined and no significant changes have occurred in the patient's condition since the H&P was performed. I discussed with the patient and/or legal representative the potential benefits, risks and side effects of this procedure; the likelihood of the patient achieving goals; and potential problems that might occur during recuperation. I discussed reasonable alternatives to the procedure, including risks, benefits and side effects related to the alternatives and risks related to not receiving this procedure. We will proceed with procedure as planned.

## 2022-02-19 NOTE — OPERATIVE REPORT
Pre-Operative Diagnosis: Chronic intermenstrual bleeding    Post-Operative Diagnosis: same, endometrial polyps    Procedure Performed: Hysteroscopy, dilation and curettage, endometrial polypectomy, placement Mirena IUD     Surgeon(s) and Role:     Mitra Drake MD - Primary     Assistant(s):  CODY     Surgical Findings:   Normal endocervical canal.  Multiple small endometrial polyp projecting from the lower uterine segment in area of the uterine isthmocele. Otherwise normal endometrial lining. Normal tubal ostia bilaterally. Specimen: [1] Endometrial curettings [2] Endometrial polyps     Estimated Blood Loss: 15 ml    Procedure:  After obtaining informed consent, the patient was brought into the operating room and placed on the operating room table in supine position. MAC anesthesia was performed and the patient then placed in dorsal lithotomy position. The perineum and vagina were then prepped and draped in the usual sterile fashion. Bimanual examination was then performed and the uterus noted to be 8 weeks sized, midposition. A bivalved speculum was placed in the vagina and a single toothed tenaculum used to grasp the anterior lip of the cervix. The uterus was sounded to 7 cm. The cervix was then dilated to a #7 Hegar dilator. Diagnostic hysteroscopy was then performed with the above noted findings. The patient was noted to have multiple small endometrial polyps projecting circumferentially in the region of the lower uterine segment/uterine isthmocele. Using the endometrial curet, the polyps were excised down to normal myometrium. Endometrial curettage was then performed with a small amount of tissue obtained and sent to pathology for examination along with the polyps. A new Mirena IUD was inserted without complications using correct insertion technique. The strings were trimmed to 3 cms. .  Fluid deficit was 35 ml.     At the end of the procedure, all instruments were removed from the vagina with good hemostasis noted. The patient was repositioned in the supine position, awakened in the operating room and transferred to the recovery room in stable condition. Sponge and instrument counts were correct at the end of the procedure.

## 2022-02-21 NOTE — ADDENDUM NOTE
Addendum  created 02/20/22 2010 by Min Hallman MD    Intraprocedure Event edited, Intraprocedure Meds edited

## 2022-03-01 ENCOUNTER — OFFICE VISIT (OUTPATIENT)
Dept: OBGYN CLINIC | Facility: CLINIC | Age: 42
End: 2022-03-01
Payer: COMMERCIAL

## 2022-03-01 VITALS
HEART RATE: 61 BPM | WEIGHT: 153.38 LBS | SYSTOLIC BLOOD PRESSURE: 126 MMHG | HEIGHT: 62 IN | BODY MASS INDEX: 28.22 KG/M2 | DIASTOLIC BLOOD PRESSURE: 74 MMHG

## 2022-03-01 DIAGNOSIS — Z98.890 POST-OPERATIVE STATE: Primary | ICD-10-CM

## 2022-03-01 PROCEDURE — 99024 POSTOP FOLLOW-UP VISIT: CPT | Performed by: OBSTETRICS & GYNECOLOGY

## 2022-03-01 PROCEDURE — 3078F DIAST BP <80 MM HG: CPT | Performed by: OBSTETRICS & GYNECOLOGY

## 2022-03-01 PROCEDURE — 3008F BODY MASS INDEX DOCD: CPT | Performed by: OBSTETRICS & GYNECOLOGY

## 2022-03-01 PROCEDURE — 3074F SYST BP LT 130 MM HG: CPT | Performed by: OBSTETRICS & GYNECOLOGY

## 2022-03-01 NOTE — PROGRESS NOTES
Subjective:  Patient presents with:  Post-Op    Patient presents 2 weeks status post hysteroscopy, D&C, endometrial polypectomy and placement Mirena IUD . Currently without complaints. Minimal cramping, minimal bleeding. Just starting menses and very light. Denies fever or discharge. Objective:  Physical Examination:  General appearance: Well dressed, well nourished in no apparent distress  Neurologic/Psychiatric: Alert and oriented to person, place and time, mood normal, affect appropriate  Abdomen: Soft, non-tender, non-distended  Pelvic:    External genitalia- Normal, Bartholin's, urethra, skeins glands normal   Vagina- No vaginal lesions, no discharge   Cervix- No lesions, long/closed, no cervical motion tenderness, IUD strings present at correct length   Uterus- Normal sized, anteverted, non-tender, no masses   Adnexa-  Non-tender, no masses    Assessment/Plan:  Normal post operative examination, doing well. Pathology results reviewed, show benign polyp and secretory endometrium. Follow up as needed if recurrent bleeding problems, otherwise yearly checkup. Diagnoses and all orders for this visit:    Post-operative state        Return for Annual Gyn Visit.

## 2022-06-16 RX ORDER — SCOLOPAMINE TRANSDERMAL SYSTEM 1 MG/1
1 PATCH, EXTENDED RELEASE TRANSDERMAL
Qty: 4 PATCH | Refills: 0 | Status: SHIPPED | OUTPATIENT
Start: 2022-06-16

## 2022-06-16 NOTE — TELEPHONE ENCOUNTER
Patient is going on a RV trip   Patient gets motion sickness  Patient has used this in the past.  Please advise.

## 2022-08-31 ENCOUNTER — OFFICE VISIT (OUTPATIENT)
Dept: FAMILY MEDICINE CLINIC | Facility: CLINIC | Age: 42
End: 2022-08-31
Payer: COMMERCIAL

## 2022-08-31 VITALS
RESPIRATION RATE: 18 BRPM | HEART RATE: 66 BPM | DIASTOLIC BLOOD PRESSURE: 80 MMHG | WEIGHT: 153 LBS | BODY MASS INDEX: 28.16 KG/M2 | TEMPERATURE: 98 F | OXYGEN SATURATION: 98 % | HEIGHT: 62 IN | SYSTOLIC BLOOD PRESSURE: 120 MMHG

## 2022-08-31 DIAGNOSIS — L30.9 DERMATITIS: Primary | ICD-10-CM

## 2022-08-31 PROCEDURE — 3008F BODY MASS INDEX DOCD: CPT | Performed by: FAMILY MEDICINE

## 2022-08-31 PROCEDURE — 3079F DIAST BP 80-89 MM HG: CPT | Performed by: FAMILY MEDICINE

## 2022-08-31 PROCEDURE — 99214 OFFICE O/P EST MOD 30 MIN: CPT | Performed by: FAMILY MEDICINE

## 2022-08-31 PROCEDURE — 3074F SYST BP LT 130 MM HG: CPT | Performed by: FAMILY MEDICINE

## 2022-08-31 RX ORDER — PREDNISONE 20 MG/1
20 TABLET ORAL 2 TIMES DAILY
Qty: 10 TABLET | Refills: 0 | Status: SHIPPED | OUTPATIENT
Start: 2022-08-31 | End: 2022-09-05

## 2022-09-07 ENCOUNTER — PATIENT MESSAGE (OUTPATIENT)
Dept: FAMILY MEDICINE CLINIC | Facility: CLINIC | Age: 42
End: 2022-09-07

## 2022-09-07 ENCOUNTER — TELEPHONE (OUTPATIENT)
Dept: FAMILY MEDICINE CLINIC | Facility: CLINIC | Age: 42
End: 2022-09-07

## 2022-09-07 RX ORDER — METHYLPREDNISOLONE 4 MG/1
TABLET ORAL
Qty: 1 EACH | Refills: 0 | Status: SHIPPED | OUTPATIENT
Start: 2022-09-07

## 2022-09-07 NOTE — TELEPHONE ENCOUNTER
From: Man[facility]  To: Noé Shultz MD  Sent: 9/7/2022 7:04 AM CDT  Subject: Face rash     I was seen in the office and started on 5 days prednisone for a rash on my face. Within 2 days the rash had almost entirely cleared, and day 4 & 5 there was no signs of the rash. However I completed the prednisone and yesterday started noticing the rash returning around my eyes. This morning the rash is getting worse around my eyes and starting again around my mouth.

## 2022-09-07 NOTE — TELEPHONE ENCOUNTER
Extending steroid course with medrol dose pack.   Apply to mupirocin ointment to bumps near mouth  F/u next week if not resolved

## 2022-09-07 NOTE — TELEPHONE ENCOUNTER
----- Message from Unruly Euceda sent at 9/7/2022  8:56 AM CDT -----  Regarding: Face rash   The area is pink and beginning to develop tiny bumps. The area feels dry and tight. The redness around the eye is very tender. 143 Frost (0,1+,2+,3+,4+): 1+ Post Peel Care: After the procedure, the treatment area was washed with soap and water, and a post-peel cream was applied. Sun protection and post-care instructions were reviewed with the patient. Post-Care Instructions: I reviewed with the patient in detail post-care instructions. Patient should avoid sun exposure and wear sun protection. Detail Level: Zone Consent: Prior to the procedure, written consent was obtained and risks were reviewed, including but not limited to: redness, peeling, blistering, pigmentary change, scarring, infection, and pain. Treatment Number: 0 Chemical Peel: 15% TCA Prep: The treated area was degreased with pre-peel cleanser, and vaseline was applied for protection of mucous membranes. Number Of Coats: 2

## 2022-09-07 NOTE — TELEPHONE ENCOUNTER
Pt calling to follow up on this message. Pt notified we have sent the message to Dr. Jaquelin Soto. I also asked her to take a photo of the rash and send it via [facility].

## 2022-09-12 ENCOUNTER — TELEPHONE (OUTPATIENT)
Dept: FAMILY MEDICINE CLINIC | Facility: CLINIC | Age: 42
End: 2022-09-12

## 2022-09-12 DIAGNOSIS — L30.9 ECZEMA, UNSPECIFIED TYPE: Primary | ICD-10-CM

## 2022-09-12 NOTE — TELEPHONE ENCOUNTER
Pt needs the referral faxed to Dr Melissa Porter office. Pt was offered an appt for this week, but they won't allow her to schedule until they get the Referral.  They do not have a copy of the Referral from November.   Please fax referral to   [phone]

## 2023-03-15 ENCOUNTER — OFFICE VISIT (OUTPATIENT)
Dept: OBGYN CLINIC | Facility: CLINIC | Age: 43
End: 2023-03-15
Payer: COMMERCIAL

## 2023-03-15 VITALS
HEART RATE: 74 BPM | SYSTOLIC BLOOD PRESSURE: 116 MMHG | BODY MASS INDEX: 26.79 KG/M2 | WEIGHT: 155 LBS | HEIGHT: 63.75 IN | DIASTOLIC BLOOD PRESSURE: 72 MMHG

## 2023-03-15 DIAGNOSIS — Z12.4 SCREENING FOR CERVICAL CANCER: ICD-10-CM

## 2023-03-15 DIAGNOSIS — Z12.31 ENCOUNTER FOR SCREENING MAMMOGRAM FOR MALIGNANT NEOPLASM OF BREAST: ICD-10-CM

## 2023-03-15 DIAGNOSIS — Z80.0 FAMILY HISTORY OF COLON CANCER: ICD-10-CM

## 2023-03-15 DIAGNOSIS — Z01.419 WELL WOMAN EXAM WITH ROUTINE GYNECOLOGICAL EXAM: Primary | ICD-10-CM

## 2023-03-15 DIAGNOSIS — Z11.51 SCREENING FOR HUMAN PAPILLOMAVIRUS (HPV): ICD-10-CM

## 2023-03-15 DIAGNOSIS — Z80.3 FAMILY HISTORY OF BREAST CANCER: ICD-10-CM

## 2023-03-15 DIAGNOSIS — Z30.432 ENCOUNTER FOR REMOVAL OF INTRAUTERINE CONTRACEPTIVE DEVICE: ICD-10-CM

## 2023-03-15 PROCEDURE — 58301 REMOVE INTRAUTERINE DEVICE: CPT | Performed by: NURSE PRACTITIONER

## 2023-03-15 PROCEDURE — 99396 PREV VISIT EST AGE 40-64: CPT | Performed by: NURSE PRACTITIONER

## 2023-03-15 PROCEDURE — 3008F BODY MASS INDEX DOCD: CPT | Performed by: NURSE PRACTITIONER

## 2023-03-15 PROCEDURE — 3078F DIAST BP <80 MM HG: CPT | Performed by: NURSE PRACTITIONER

## 2023-03-15 PROCEDURE — 3074F SYST BP LT 130 MM HG: CPT | Performed by: NURSE PRACTITIONER

## 2023-03-15 PROCEDURE — 87624 HPV HI-RISK TYP POOLED RSLT: CPT | Performed by: NURSE PRACTITIONER

## 2023-03-15 NOTE — PROCEDURES
Procedure Note: IUD removal     Preoperative Diagnosis:  IUD in place     Postoperative Diagnosis:  S/p IUD removal     Indications:  38year old y/o  female with Mirena IUD in placed since 2022 who presents for IUD removal per patient request.     Procedure:    A discussion was held with the patient about risks, benefits and alternatives for the procedure. The patient verbalized understanding and requested IUD removal. All questions and concerns addressed. Verbal and written consent was obtained. The patient was placed in dorsal position with heels secured in stirrups. A sterile speculum was placed in the vagina. The cervix was visualized with 2 visible IUD strings noted from at the external os. A pap smear was then performed and collected to be for evaluation. Sterile ring forceps were then advanced towards the cervix and used to grasp the IUD strings. The IUD was then removed with the sterile forceps without difficulty or complications. The IUD appeared intact and was shown to the patient prior to be properly discarded. The patient reported she was doing well. All instrument were then removed from the vagina. Precautions provided to the patient. A discussion was held with the patient regarding contraception. The patients partner had a vasectomy. The patient was advised to return to clinic in 1 year for a well woman exam or sooner if needed.      Condition: Stable    Disposition: RTC in 1 year or sooner

## 2023-03-16 LAB — HPV I/H RISK 1 DNA SPEC QL NAA+PROBE: NEGATIVE

## 2023-03-30 LAB
LAST PAP RESULT: NORMAL
PAP HISTORY (OTHER THAN LAST PAP): NORMAL

## 2023-04-19 ENCOUNTER — HOSPITAL ENCOUNTER (OUTPATIENT)
Dept: MAMMOGRAPHY | Age: 43
Discharge: HOME | End: 2023-04-19
Attending: NURSE PRACTITIONER
Payer: COMMERCIAL

## 2023-04-19 DIAGNOSIS — Z80.3 FAMILY HISTORY OF BREAST CANCER: ICD-10-CM

## 2023-04-19 DIAGNOSIS — Z12.31 ENCOUNTER FOR SCREENING MAMMOGRAM FOR MALIGNANT NEOPLASM OF BREAST: ICD-10-CM

## 2023-04-19 PROCEDURE — 77063 BREAST TOMOSYNTHESIS BI: CPT | Performed by: NURSE PRACTITIONER

## 2023-04-19 PROCEDURE — 77067 SCR MAMMO BI INCL CAD: CPT | Performed by: NURSE PRACTITIONER

## 2023-04-20 NOTE — PROGRESS NOTES
-- DO NOT REPLY / DO NOT REPLY ALL --  -- Message is from the [facility]--    Referral Request  Name of Specialist: [facility]  Provider's specialty: Palliative Medicine    Medical condition for referral:Palliative home care    Is this a NEW request?: yes      Referral ordered by: Liliana Valencia      Insurance type: [employer]      [facility] MEDICAID / Plan: [facility] MEDICAID HMO / Product Type: T19 HMO      Preferred Delivery Method   Call when ready for pickup - phone number to notify: [phone]    Caller Information       Type Contact Phone    09/27/2021 03:24 PM CDT Phone (Incoming) ESMER HARPER (Emergency Contact) [phone]          Alternative phone number: none    Turnaround time given to caller:   \"This message will be sent to [state Provider's full name]. The clinical team will return your call as soon as they review your message. Typically, it takes 3 business days to process referral requests.\"     [employer] message sent.

## 2023-12-04 ENCOUNTER — LAB ENCOUNTER (OUTPATIENT)
Dept: LAB | Age: 43
End: 2023-12-04
Attending: FAMILY MEDICINE
Payer: COMMERCIAL

## 2023-12-04 ENCOUNTER — OFFICE VISIT (OUTPATIENT)
Dept: FAMILY MEDICINE CLINIC | Facility: CLINIC | Age: 43
End: 2023-12-04
Payer: COMMERCIAL

## 2023-12-04 VITALS
DIASTOLIC BLOOD PRESSURE: 60 MMHG | HEIGHT: 63 IN | SYSTOLIC BLOOD PRESSURE: 110 MMHG | OXYGEN SATURATION: 98 % | WEIGHT: 155 LBS | HEART RATE: 57 BPM | BODY MASS INDEX: 27.46 KG/M2 | TEMPERATURE: 98 F | RESPIRATION RATE: 18 BRPM

## 2023-12-04 DIAGNOSIS — Z00.00 ANNUAL PHYSICAL EXAM: Primary | ICD-10-CM

## 2023-12-04 DIAGNOSIS — Z23 NEED FOR VACCINATION: ICD-10-CM

## 2023-12-04 DIAGNOSIS — Z00.00 ANNUAL PHYSICAL EXAM: ICD-10-CM

## 2023-12-04 LAB
ALBUMIN SERPL-MCNC: 3.8 G/DL (ref 3.4–5)
ALBUMIN/GLOB SERPL: 1.2 {RATIO} (ref 1–2)
ALP LIVER SERPL-CCNC: 43 U/L
ALT SERPL-CCNC: 33 U/L
ANION GAP SERPL CALC-SCNC: 3 MMOL/L (ref 0–18)
AST SERPL-CCNC: 28 U/L (ref 15–37)
BASOPHILS # BLD AUTO: 0.02 X10(3) UL (ref 0–0.2)
BASOPHILS NFR BLD AUTO: 0.5 %
BILIRUB SERPL-MCNC: 0.5 MG/DL (ref 0.1–2)
BUN BLD-MCNC: 16 MG/DL (ref 9–23)
CALCIUM BLD-MCNC: 8.9 MG/DL (ref 8.5–10.1)
CHLORIDE SERPL-SCNC: 108 MMOL/L (ref 98–112)
CHOLEST SERPL-MCNC: 161 MG/DL (ref ?–200)
CO2 SERPL-SCNC: 27 MMOL/L (ref 21–32)
CREAT BLD-MCNC: 1 MG/DL
EGFRCR SERPLBLD CKD-EPI 2021: 73 ML/MIN/1.73M2 (ref 60–?)
EOSINOPHIL # BLD AUTO: 0.11 X10(3) UL (ref 0–0.7)
EOSINOPHIL NFR BLD AUTO: 2.6 %
ERYTHROCYTE [DISTWIDTH] IN BLOOD BY AUTOMATED COUNT: 12.2 %
EST. AVERAGE GLUCOSE BLD GHB EST-MCNC: 103 MG/DL (ref 68–126)
FASTING PATIENT LIPID ANSWER: YES
FASTING STATUS PATIENT QL REPORTED: YES
GLOBULIN PLAS-MCNC: 3.3 G/DL (ref 2.8–4.4)
GLUCOSE BLD-MCNC: 89 MG/DL (ref 70–99)
HBA1C MFR BLD: 5.2 % (ref ?–5.7)
HCT VFR BLD AUTO: 40.8 %
HDLC SERPL-MCNC: 68 MG/DL (ref 40–59)
HGB BLD-MCNC: 13.5 G/DL
IMM GRANULOCYTES # BLD AUTO: 0 X10(3) UL (ref 0–1)
IMM GRANULOCYTES NFR BLD: 0 %
LDLC SERPL CALC-MCNC: 83 MG/DL (ref ?–100)
LYMPHOCYTES # BLD AUTO: 1.69 X10(3) UL (ref 1–4)
LYMPHOCYTES NFR BLD AUTO: 39.8 %
MCH RBC QN AUTO: 29.3 PG (ref 26–34)
MCHC RBC AUTO-ENTMCNC: 33.1 G/DL (ref 31–37)
MCV RBC AUTO: 88.5 FL
MONOCYTES # BLD AUTO: 0.35 X10(3) UL (ref 0.1–1)
MONOCYTES NFR BLD AUTO: 8.2 %
NEUTROPHILS # BLD AUTO: 2.08 X10 (3) UL (ref 1.5–7.7)
NEUTROPHILS # BLD AUTO: 2.08 X10(3) UL (ref 1.5–7.7)
NEUTROPHILS NFR BLD AUTO: 48.9 %
NONHDLC SERPL-MCNC: 93 MG/DL (ref ?–130)
OSMOLALITY SERPL CALC.SUM OF ELEC: 287 MOSM/KG (ref 275–295)
PLATELET # BLD AUTO: 181 10(3)UL (ref 150–450)
POTASSIUM SERPL-SCNC: 4.4 MMOL/L (ref 3.5–5.1)
PROT SERPL-MCNC: 7.1 G/DL (ref 6.4–8.2)
RBC # BLD AUTO: 4.61 X10(6)UL
SODIUM SERPL-SCNC: 138 MMOL/L (ref 136–145)
TRIGL SERPL-MCNC: 47 MG/DL (ref 30–149)
TSI SER-ACNC: 1.14 MIU/ML (ref 0.36–3.74)
VLDLC SERPL CALC-MCNC: 7 MG/DL (ref 0–30)
WBC # BLD AUTO: 4.3 X10(3) UL (ref 4–11)

## 2023-12-04 PROCEDURE — 90715 TDAP VACCINE 7 YRS/> IM: CPT | Performed by: FAMILY MEDICINE

## 2023-12-04 PROCEDURE — 90472 IMMUNIZATION ADMIN EACH ADD: CPT | Performed by: FAMILY MEDICINE

## 2023-12-04 PROCEDURE — 80053 COMPREHEN METABOLIC PANEL: CPT

## 2023-12-04 PROCEDURE — 36415 COLL VENOUS BLD VENIPUNCTURE: CPT

## 2023-12-04 PROCEDURE — 99396 PREV VISIT EST AGE 40-64: CPT | Performed by: FAMILY MEDICINE

## 2023-12-04 PROCEDURE — 3008F BODY MASS INDEX DOCD: CPT | Performed by: FAMILY MEDICINE

## 2023-12-04 PROCEDURE — 84443 ASSAY THYROID STIM HORMONE: CPT

## 2023-12-04 PROCEDURE — 90471 IMMUNIZATION ADMIN: CPT | Performed by: FAMILY MEDICINE

## 2023-12-04 PROCEDURE — 85025 COMPLETE CBC W/AUTO DIFF WBC: CPT

## 2023-12-04 PROCEDURE — 80061 LIPID PANEL: CPT

## 2023-12-04 PROCEDURE — 90686 IIV4 VACC NO PRSV 0.5 ML IM: CPT | Performed by: FAMILY MEDICINE

## 2023-12-04 PROCEDURE — 3074F SYST BP LT 130 MM HG: CPT | Performed by: FAMILY MEDICINE

## 2023-12-04 PROCEDURE — 3078F DIAST BP <80 MM HG: CPT | Performed by: FAMILY MEDICINE

## 2023-12-04 PROCEDURE — 83036 HEMOGLOBIN GLYCOSYLATED A1C: CPT

## 2023-12-04 RX ORDER — SCOLOPAMINE TRANSDERMAL SYSTEM 1 MG/1
1 PATCH, EXTENDED RELEASE TRANSDERMAL
Qty: 4 PATCH | Refills: 0 | Status: SHIPPED | OUTPATIENT
Start: 2024-03-01

## 2023-12-04 RX ORDER — SULFACETAMIDE SODIUM, SULFUR 98; 48 MG/G; MG/G
LIQUID TOPICAL
COMMUNITY
Start: 2023-10-18

## 2024-02-27 ENCOUNTER — E-VISIT (OUTPATIENT)
Dept: TELEHEALTH | Age: 44
End: 2024-02-27
Payer: COMMERCIAL

## 2024-02-27 DIAGNOSIS — J06.9 ACUTE URI: Primary | ICD-10-CM

## 2024-02-27 DIAGNOSIS — R50.9 FEVER IN ADULT: ICD-10-CM

## 2024-02-27 PROCEDURE — 99421 OL DIG E/M SVC 5-10 MIN: CPT | Performed by: PHYSICIAN ASSISTANT

## 2024-02-27 NOTE — PROGRESS NOTES
Brenda Euceda is a 43 year old female who initiated e-visit care today.    HPI:   See answers to questionnaire submission     Current Outpatient Medications   Medication Sig Dispense Refill    PLEXION CLEANSER 9.8-4.8 % External Liquid Wash face twice a day      [START ON 3/1/2024] Scopolamine 1.5mg TD patch 1mg/3days Place 1 patch onto the skin every third day. 4 patch 0    Sulfacetamide Sodium, Acne, 10 % External Lotion APPLY EXTERNALLY TO FACE EVERY MORNING 118 mL 2      Past Medical History:   Diagnosis Date    Abdominal pain     Occasional    Adult acne     Bloating     Chronic    Decorative tattoo     Eczema     Flatulence/gas pain/belching     Chronic    Heartburn 2018    Frequent occasional heart burn >1year    Hx of motion sickness     Indigestion     Stress       Past Surgical History:   Procedure Laterality Date    BENIGN BIOPSY RIGHT            x2    COLONOSCOPY      HYSTEROSCOPY,BIOPSY  2022    D&C, endometrial polypectomy, placement Mirena IUD    MINH BIOPSY STEREO NODULE 1 SITE RIGHT (CPT=19081)  ?     benign    UPPER GI ENDOSCOPY,EXAM        Family History   Problem Relation Age of Onset    Hypertension Maternal Grandmother     Lipids Maternal Grandmother     Cancer Maternal Grandmother         breast cancer (40s), colon ca (40s)    Breast Cancer Maternal Grandmother 45        approx age     Colon Cancer Maternal Grandmother         Had partial resection in 40s    Crohn's Disease Sister         Sister has a history of IBS    Heart Disease Neg     Stroke Neg     Diabetes Neg     Heart Disorder Neg       Social History:  Social History     Socioeconomic History    Marital status:    Tobacco Use    Smoking status: Former     Packs/day: 1.50     Years: 10.00     Additional pack years: 0.00     Total pack years: 15.00     Types: Cigarettes     Quit date: 2008     Years since quitting: 15.3    Smokeless tobacco: Never    Tobacco comments:     Smoker [id number]   Vaping Use     Vaping Use: Never used   Substance and Sexual Activity    Alcohol use: Yes     Alcohol/week: 1.0 standard drink of alcohol     Types: 1 Glasses of wine per week     Comment: occ    Drug use: No    Sexual activity: Yes     Partners: Male     Birth control/protection: Vasectomy   Other Topics Concern    Caffeine Concern Yes     Comment: 2-3 cups daily     Exercise Yes     Comment: 4-5x weekly     Seat Belt Yes         ASSESSMENT AND PLAN:       Diagnoses and all orders for this visit:    Acute URI    Fever in adult       Sx present x5 days, return of fever after no fever for 48 hours.  Could be sinusitis but needs physical exam and possible viral testing, no COVID testing done thus far. Referred to any [facility]IC/[facility] for examination     Duration of  the service:  5 minutes      See [facility] message exchange and Patient Instructions for Comfort Care and patient education.

## 2024-10-03 ENCOUNTER — PATIENT MESSAGE (OUTPATIENT)
Dept: OBGYN CLINIC | Facility: CLINIC | Age: 44
End: 2024-10-03

## 2024-10-04 NOTE — TELEPHONE ENCOUNTER
From: Brenda Euceda  To: Stephanie Milligan  Sent: 10/3/2024 9:41 PM CDT  Subject: Annual mammogram     I have a message in the harlan that I am past due for my mammogram, but I do not see how to schedule it. Can you provide assistance/guidance? Do I need a new order?

## 2024-10-24 ENCOUNTER — HOSPITAL ENCOUNTER (OUTPATIENT)
Dept: MAMMOGRAPHY | Age: 44
Discharge: HOME | End: 2024-10-24
Attending: NURSE PRACTITIONER
Payer: COMMERCIAL

## 2024-10-24 DIAGNOSIS — Z12.31 BREAST CANCER SCREENING BY MAMMOGRAM: ICD-10-CM

## 2024-10-24 PROCEDURE — 77063 BREAST TOMOSYNTHESIS BI: CPT | Performed by: NURSE PRACTITIONER

## 2024-10-24 PROCEDURE — 77067 SCR MAMMO BI INCL CAD: CPT | Performed by: NURSE PRACTITIONER

## 2024-11-04 ENCOUNTER — PATIENT MESSAGE (OUTPATIENT)
Dept: FAMILY MEDICINE CLINIC | Facility: CLINIC | Age: 44
End: 2024-11-04

## 2024-11-04 ENCOUNTER — LAB ENCOUNTER (OUTPATIENT)
Dept: LAB | Age: 44
End: 2024-11-04
Attending: NURSE PRACTITIONER
Payer: COMMERCIAL

## 2024-11-04 DIAGNOSIS — Z00.00 GENERAL MEDICAL EXAM: ICD-10-CM

## 2024-11-04 LAB
ALBUMIN SERPL-MCNC: 4.5 G/DL (ref 3.2–4.8)
ALBUMIN/GLOB SERPL: 1.6 {RATIO} (ref 1–2)
ALP LIVER SERPL-CCNC: 46 U/L
ALT SERPL-CCNC: 19 U/L
ANION GAP SERPL CALC-SCNC: 4 MMOL/L (ref 0–18)
AST SERPL-CCNC: 26 U/L (ref ?–34)
BASOPHILS # BLD AUTO: 0.03 X10(3) UL (ref 0–0.2)
BASOPHILS NFR BLD AUTO: 0.8 %
BILIRUB SERPL-MCNC: 0.6 MG/DL (ref 0.3–1.2)
BUN BLD-MCNC: 10 MG/DL (ref 9–23)
CALCIUM BLD-MCNC: 9.7 MG/DL (ref 8.7–10.4)
CHLORIDE SERPL-SCNC: 107 MMOL/L (ref 98–112)
CHOLEST SERPL-MCNC: 164 MG/DL (ref ?–200)
CO2 SERPL-SCNC: 27 MMOL/L (ref 21–32)
CREAT BLD-MCNC: 1.09 MG/DL
EGFRCR SERPLBLD CKD-EPI 2021: 65 ML/MIN/1.73M2 (ref 60–?)
EOSINOPHIL # BLD AUTO: 0.13 X10(3) UL (ref 0–0.7)
EOSINOPHIL NFR BLD AUTO: 3.4 %
ERYTHROCYTE [DISTWIDTH] IN BLOOD BY AUTOMATED COUNT: 12.7 %
EST. AVERAGE GLUCOSE BLD GHB EST-MCNC: 97 MG/DL (ref 68–126)
FASTING PATIENT LIPID ANSWER: YES
FASTING STATUS PATIENT QL REPORTED: YES
GLOBULIN PLAS-MCNC: 2.8 G/DL (ref 2–3.5)
GLUCOSE BLD-MCNC: 91 MG/DL (ref 70–99)
HBA1C MFR BLD: 5 % (ref ?–5.7)
HCT VFR BLD AUTO: 41.8 %
HDLC SERPL-MCNC: 64 MG/DL (ref 40–59)
HGB BLD-MCNC: 14 G/DL
IMM GRANULOCYTES # BLD AUTO: 0.01 X10(3) UL (ref 0–1)
IMM GRANULOCYTES NFR BLD: 0.3 %
LDLC SERPL CALC-MCNC: 88 MG/DL (ref ?–100)
LYMPHOCYTES # BLD AUTO: 1.72 X10(3) UL (ref 1–4)
LYMPHOCYTES NFR BLD AUTO: 45.6 %
MCH RBC QN AUTO: 29.4 PG (ref 26–34)
MCHC RBC AUTO-ENTMCNC: 33.5 G/DL (ref 31–37)
MCV RBC AUTO: 87.8 FL
MONOCYTES # BLD AUTO: 0.48 X10(3) UL (ref 0.1–1)
MONOCYTES NFR BLD AUTO: 12.7 %
NEUTROPHILS # BLD AUTO: 1.4 X10 (3) UL (ref 1.5–7.7)
NEUTROPHILS # BLD AUTO: 1.4 X10(3) UL (ref 1.5–7.7)
NEUTROPHILS NFR BLD AUTO: 37.2 %
NONHDLC SERPL-MCNC: 100 MG/DL (ref ?–130)
OSMOLALITY SERPL CALC.SUM OF ELEC: 285 MOSM/KG (ref 275–295)
PLATELET # BLD AUTO: 193 10(3)UL (ref 150–450)
POTASSIUM SERPL-SCNC: 4.7 MMOL/L (ref 3.5–5.1)
PROT SERPL-MCNC: 7.3 G/DL (ref 5.7–8.2)
RBC # BLD AUTO: 4.76 X10(6)UL
SODIUM SERPL-SCNC: 138 MMOL/L (ref 136–145)
TRIGL SERPL-MCNC: 62 MG/DL (ref 30–149)
TSI SER-ACNC: 1.56 UIU/ML (ref 0.55–4.78)
VLDLC SERPL CALC-MCNC: 10 MG/DL (ref 0–30)
WBC # BLD AUTO: 3.8 X10(3) UL (ref 4–11)

## 2024-11-04 PROCEDURE — 85025 COMPLETE CBC W/AUTO DIFF WBC: CPT

## 2024-11-04 PROCEDURE — 36415 COLL VENOUS BLD VENIPUNCTURE: CPT

## 2024-11-04 PROCEDURE — 84443 ASSAY THYROID STIM HORMONE: CPT

## 2024-11-04 PROCEDURE — 80061 LIPID PANEL: CPT

## 2024-11-04 PROCEDURE — 80053 COMPREHEN METABOLIC PANEL: CPT

## 2024-11-04 PROCEDURE — 83036 HEMOGLOBIN GLYCOSYLATED A1C: CPT

## 2024-11-07 ENCOUNTER — HOSPITAL ENCOUNTER (OUTPATIENT)
Age: 44
Discharge: HOME | End: 2024-11-07
Payer: COMMERCIAL

## 2024-11-07 VITALS
RESPIRATION RATE: 18 BRPM | DIASTOLIC BLOOD PRESSURE: 81 MMHG | OXYGEN SATURATION: 100 % | HEART RATE: 69 BPM | TEMPERATURE: 98 F | SYSTOLIC BLOOD PRESSURE: 137 MMHG

## 2024-11-07 DIAGNOSIS — R30.0 DYSURIA: Primary | ICD-10-CM

## 2024-11-07 LAB
B-HCG UR QL: NEGATIVE
BILIRUB UR QL STRIP: NEGATIVE
CLARITY UR: CLEAR
COLOR UR: YELLOW
GLUCOSE UR STRIP-MCNC: NEGATIVE MG/DL
HGB UR QL STRIP: NEGATIVE
KETONES UR STRIP-MCNC: NEGATIVE MG/DL
LEUKOCYTE ESTERASE UR QL STRIP: NEGATIVE
NITRITE UR QL STRIP: NEGATIVE
PH UR STRIP: 6.5 [PH]
PROT UR STRIP-MCNC: NEGATIVE MG/DL
SP GR UR STRIP: 1.02
UROBILINOGEN UR STRIP-ACNC: <2 MG/DL

## 2024-11-07 PROCEDURE — 81002 URINALYSIS NONAUTO W/O SCOPE: CPT | Performed by: NURSE PRACTITIONER

## 2024-11-07 PROCEDURE — 81025 URINE PREGNANCY TEST: CPT | Performed by: NURSE PRACTITIONER

## 2024-11-07 PROCEDURE — 99213 OFFICE O/P EST LOW 20 MIN: CPT | Performed by: NURSE PRACTITIONER

## 2024-11-08 NOTE — ED PROVIDER NOTES
Patient Seen in: [facility]      History     Chief Complaint   Patient presents with    Urinary Symptoms     Stated Complaint: painful urination    Subjective:   HPI  45 yo female presents with continued dysuria after taking Bactrim and starting Macrobid. She reports some lower back aching. Denies stabbing flank pain, nausea/vomiting, or fever.    Objective:     Past Medical History:    Abdominal pain    Occasional    Adult acne    Bloating    Chronic    Decorative tattoo    Eczema    Flatulence/gas pain/belching    Chronic    Heartburn    Frequent occasional heart burn >1year    Hx of motion sickness    Indigestion    Stress              Past Surgical History:   Procedure Laterality Date    Benign biopsy right            x2    Colonoscopy      Hysteroscopy,biopsy  2022    D&C, endometrial polypectomy, placement Mirena IUD    Radha biopsy stereo nodule 1 site right (cpt=19081)  ?     benign    Upper gi endoscopy,exam                  Social History     Socioeconomic History    Marital status:    Tobacco Use    Smoking status: Former     Current packs/day: 0.00     Average packs/day: 1.5 packs/day for 10.0 years (15.0 ttl pk-yrs)     Types: Cigarettes     Start date: 1998     Quit date: 2008     Years since quittin.0    Smokeless tobacco: Never    Tobacco comments:     Smoker [id number]   Vaping Use    Vaping status: Never Used   Substance and Sexual Activity    Alcohol use: Yes     Alcohol/week: 1.0 standard drink of alcohol     Types: 1 Glasses of wine per week     Comment: occ    Drug use: No    Sexual activity: Yes     Partners: Male     Birth control/protection: Vasectomy   Other Topics Concern    Caffeine Concern Yes     Comment: 2-3 cups daily     Exercise Yes     Comment: 4-5x weekly     Seat Belt Yes              Review of Systems   All other systems reviewed and are negative.      Positive for stated complaint: painful urination  Other systems are as noted  in HPI.  Constitutional and vital signs reviewed.      All other systems reviewed and negative except as noted above.    Physical Exam     ED Triage Vitals [11/07/24 1841]   /81   Pulse 69   Resp 18   Temp 97.7 °F (36.5 °C)   Temp src Temporal   SpO2 100 %   O2 Device None (Room air)       Current Vitals:   Vital Signs  BP: 137/81  Pulse: 69  Resp: 18  Temp: 97.7 °F (36.5 °C)  Temp src: Temporal    Oxygen Therapy  SpO2: 100 %  O2 Device: None (Room air)        Physical Exam  Vitals and nursing note reviewed.   Constitutional:       General: She is not in acute distress.     Appearance: She is well-developed. She is not ill-appearing or toxic-appearing.   Cardiovascular:      Rate and Rhythm: Normal rate and regular rhythm.      Heart sounds: Normal heart sounds.   Pulmonary:      Effort: Pulmonary effort is normal.      Breath sounds: Normal breath sounds.   Abdominal:      Tenderness: There is no right CVA tenderness or left CVA tenderness.   Skin:     General: Skin is warm and dry.   Neurological:      Mental Status: She is alert and oriented to person, place, and time.             ED Course     Labs Reviewed   POCT PREGNANCY URINE - Normal   [facility] POCT URINALYSIS DIPSTICK   URINE CULTURE, ROUTINE                   MDM     Medical Decision Making  43 yo female presents with continued dysuria after taking Bactrim and starting Macrobid. She reports some lower back aching. Denies stabbing flank pain, nausea/vomiting, or fever.    Pertinent Labs & Imaging studies reviewed. (See chart for details).  Patient coming in with dysuria.   Differential diagnosis includes but not limited to dysuria, pyelonephritis, kidney stone  Labs reviewed pregnancy negative with normal urine dip.  Culture sent.   Will treat for dysuria.  Will discharge on continue Macrobid as directed pending urine culture. Patient/Parent is comfortable with this plan.    Overall Pt looks good. Non-toxic, well-hydrated and in no respiratory distress.  Vital signs are reassuring. Exam is reassuring. I do not believe pt requires and additional diagnostic studies or intervention. I believe pt can be discharged home to continue evaluation as an outpatient. Follow-up provider given. Discharge instructions given and reviewed. Return for any problems. All understand and agree with the plan.        Problems Addressed:  Dysuria: acute illness or injury        Disposition and Plan     Clinical Impression:  1. Dysuria         Disposition:  Discharge  11/7/2024  7:14 pm    Follow-up:  No follow-up provider specified.        Medications Prescribed:  Current Discharge Medication List              Supplementary Documentation:

## 2024-11-09 ENCOUNTER — HOSPITAL ENCOUNTER (OUTPATIENT)
Age: 44
Discharge: HOME | End: 2024-11-09
Payer: COMMERCIAL

## 2024-11-09 VITALS
DIASTOLIC BLOOD PRESSURE: 78 MMHG | TEMPERATURE: 98 F | OXYGEN SATURATION: 100 % | HEART RATE: 79 BPM | RESPIRATION RATE: 18 BRPM | SYSTOLIC BLOOD PRESSURE: 129 MMHG

## 2024-11-09 DIAGNOSIS — R39.9 UTI SYMPTOMS: Primary | ICD-10-CM

## 2024-11-09 LAB
B-HCG UR QL: NEGATIVE
BILIRUB UR QL STRIP: NEGATIVE
CLARITY UR: CLEAR
COLOR UR: YELLOW
GLUCOSE UR STRIP-MCNC: NEGATIVE MG/DL
HGB UR QL STRIP: NEGATIVE
KETONES UR STRIP-MCNC: NEGATIVE MG/DL
LEUKOCYTE ESTERASE UR QL STRIP: NEGATIVE
NITRITE UR QL STRIP: NEGATIVE
PH UR STRIP: 7 [PH]
PROT UR STRIP-MCNC: NEGATIVE MG/DL
SP GR UR STRIP: 1.01
UROBILINOGEN UR STRIP-ACNC: <2 MG/DL

## 2024-11-09 PROCEDURE — 99214 OFFICE O/P EST MOD 30 MIN: CPT | Performed by: NURSE PRACTITIONER

## 2024-11-09 PROCEDURE — 81002 URINALYSIS NONAUTO W/O SCOPE: CPT | Performed by: NURSE PRACTITIONER

## 2024-11-09 PROCEDURE — 81025 URINE PREGNANCY TEST: CPT | Performed by: NURSE PRACTITIONER

## 2024-11-09 NOTE — DISCHARGE INSTRUCTIONS
Please continue the antibiotic as discussed. We will send a urine culture and advise you if a change is needed in your antibiotic.  Wipe from front to back after using the bathroom every time. Consider wet wipes for cleaning.  You may use a warm pack/heating pad to the back/belly if needed, warm water from the shower to the abdomen, no soaps or lotions to vaginal area.  Change your pad frequently during your menses.  If sexually active urinate before and after sexual intercourse and wipe front to back.  Stay well hydrated, wear cotton underwear to decrease the spread of E. Coli to the urethra.  Cranberry extract may help make bacteria less likely to live in the bladder. Consider supplements during illness or regularly if frequent infections occur.  Please follow up with your primary care provider as scheduled next week.   Seek immediate care for worsening symptoms.

## 2024-11-09 NOTE — ED INITIAL ASSESSMENT (HPI)
Patient states she was treated for a UTI with Bactrim approximately 10 days ago. Was off the antibiotic 1 day and she began having increased frequency to urinate again. Was seen in [facility] on 11/7 for dysuria. States she is here for further investigation of her symptoms and her elevated cr and decreased GFR.

## 2024-11-09 NOTE — ED PROVIDER NOTES
Patient Seen in: [facility]      History     Chief Complaint   Patient presents with    Dysuria     Stated Complaint: UTI symptoms    Subjective:   45 yo female presents with complaint of ongoing urinary frequency for over 10 days. Endorses a slight tingling sensation at the end of voids.   Pt was treated for a UTI with Bactrim approximately 10 days ago. Was off the antibiotic 1 day and she began having increased frequency.    Then seen in [facility] 2 days ago for dysuria; advised to continue Macrobid. Pt states Macrobid is helping symptoms.   Also concerned about her decreasing GFR and increased Creatinine that they have been watching for a year.     Pt denies fever, chills, sore throat, abdominal pain, hematuria, dysuria, nausea, vomiting, diarrhea, vaginal discharge.    The history is provided by the patient.           Objective:     Past Medical History:    Abdominal pain    Occasional    Adult acne    Bloating    Chronic    Decorative tattoo    Eczema    Flatulence/gas pain/belching    Chronic    Heartburn    Frequent occasional heart burn >1year    Hx of motion sickness    Indigestion    Stress              Past Surgical History:   Procedure Laterality Date    Benign biopsy right            x2    Colonoscopy      Hysteroscopy,biopsy  2022    D&C, endometrial polypectomy, placement Mirena IUD    Radha biopsy stereo nodule 1 site right (cpt=19081)  ?     benign    Upper gi endoscopy,exam                  Social History     Socioeconomic History    Marital status:    Tobacco Use    Smoking status: Former     Current packs/day: 0.00     Average packs/day: 1.5 packs/day for 10.0 years (15.0 ttl pk-yrs)     Types: Cigarettes     Start date: 1998     Quit date: 2008     Years since quittin.0    Smokeless tobacco: Never    Tobacco comments:     Smoker [id number]   Vaping Use    Vaping status: Never Used   Substance and Sexual Activity    Alcohol use: Yes     Alcohol/week:  1.0 standard drink of alcohol     Types: 1 Glasses of wine per week     Comment: occ    Drug use: No    Sexual activity: Yes     Partners: Male     Birth control/protection: Vasectomy   Other Topics Concern    Caffeine Concern Yes     Comment: 2-3 cups daily     Exercise Yes     Comment: 4-5x weekly     Seat Belt Yes              Review of Systems   Constitutional:  Negative for chills, diaphoresis, fatigue and fever.   HENT:  Negative for trouble swallowing.    Gastrointestinal:  Negative for abdominal pain, constipation, diarrhea, nausea and vomiting.   Genitourinary:  Positive for frequency. Negative for dysuria, flank pain, hematuria, urgency and vaginal discharge.   Skin:  Negative for rash.       Positive for stated complaint: UTI symptoms  Other systems are as noted in HPI.  Constitutional and vital signs reviewed.      All other systems reviewed and negative except as noted above.    Physical Exam     ED Triage Vitals   BP 11/09/24 1059 129/78   Pulse 11/09/24 1059 79   Resp 11/09/24 1059 18   Temp 11/09/24 1059 97.9 °F (36.6 °C)   Temp src 11/09/24 0956 Temporal   SpO2 11/09/24 1059 100 %   O2 Device 11/09/24 0956 None (Room air)       Current Vitals:   Vital Signs  BP: 129/78  Pulse: 79  Resp: 18  Temp: 97.9 °F (36.6 °C)  Temp src: Temporal    Oxygen Therapy  SpO2: 100 %  O2 Device: None (Room air)        Physical Exam  Constitutional:       General: She is not in acute distress.     Appearance: Normal appearance. She is not ill-appearing, toxic-appearing or diaphoretic.   HENT:      Head: Normocephalic.      Nose: Nose normal.      Mouth/Throat:      Mouth: Mucous membranes are moist.      Pharynx: Oropharynx is clear.   Eyes:      Conjunctiva/sclera: Conjunctivae normal.   Cardiovascular:      Rate and Rhythm: Normal rate and regular rhythm.      Heart sounds: No murmur heard.  Pulmonary:      Effort: Pulmonary effort is normal.      Breath sounds: Normal breath sounds.   Abdominal:      General: Abdomen  is flat. Bowel sounds are normal. There is no distension.      Palpations: Abdomen is soft.      Tenderness: There is no abdominal tenderness. There is no right CVA tenderness, left CVA tenderness or guarding.   Skin:     General: Skin is warm and dry.   Neurological:      Mental Status: She is alert.   Psychiatric:         Mood and Affect: Mood normal.             ED Course     Labs Reviewed   POCT PREGNANCY URINE - Normal   [facility] POCT URINALYSIS DIPSTICK   URINE CULTURE, ROUTINE        MDM      Medical Decision Making  43 yo female presents with complaint of ongoing urinary frequency for over 10 days. Endorses a slight tingling sensation at the end of voids.   Diff dx includes acute cystitis vs interstitial cystitis vs perimenopausal hormone changes vs other.  On exam, pt is well appearing, afebrile, normal vitals. No CVAT, no abdominal tenderness.  UA normal; will send culture.   Ok to continue antibiotic since helping symptoms. Drink plenty of fluids.  Pt requesting imaging today due to urinary frequency. Explained that IC does not have ultrasound today and CT scan is not indicated. Pt has an appointment with PCP in 3 days.   Urogyne referral provided.   ED precautions emphasized.   Follow up with PCP as scheduled in 3 days.   Pt agreeable to plan.    Discussed case with Dr. Alex Mireles: ED physician.       Amount and/or Complexity of Data Reviewed  External Data Reviewed: labs and notes.  Labs: ordered.    Risk  OTC drugs.  Prescription drug management.        Disposition and Plan     Clinical Impression:  1. UTI symptoms         Disposition:  Discharge  11/9/2024 10:56 am    Follow-up:  Che Correia MD  [address] [address] [phone]      this week as scheduled    Chandana Crews MD  [address]  [address] [address] [phone]    Schedule an appointment as soon as possible for a visit             Medications Prescribed:  Discharge Medication List as of 11/9/2024 10:59  AM              Supplementary Documentation:

## 2024-11-12 ENCOUNTER — HOSPITAL ENCOUNTER (OUTPATIENT)
Dept: ULTRASOUND IMAGING | Age: 44
Discharge: HOME | End: 2024-11-12
Attending: FAMILY MEDICINE
Payer: COMMERCIAL

## 2024-11-12 ENCOUNTER — OFFICE VISIT (OUTPATIENT)
Dept: FAMILY MEDICINE CLINIC | Facility: CLINIC | Age: 44
End: 2024-11-12
Payer: COMMERCIAL

## 2024-11-12 VITALS
HEIGHT: 62 IN | SYSTOLIC BLOOD PRESSURE: 114 MMHG | BODY MASS INDEX: 28.67 KG/M2 | OXYGEN SATURATION: 99 % | WEIGHT: 155.81 LBS | TEMPERATURE: 98 F | DIASTOLIC BLOOD PRESSURE: 74 MMHG | HEART RATE: 56 BPM | RESPIRATION RATE: 18 BRPM

## 2024-11-12 DIAGNOSIS — N32.81 OVERACTIVE BLADDER: ICD-10-CM

## 2024-11-12 DIAGNOSIS — R79.89 ELEVATED SERUM CREATININE: ICD-10-CM

## 2024-11-12 DIAGNOSIS — Z00.00 ANNUAL PHYSICAL EXAM: Primary | ICD-10-CM

## 2024-11-12 DIAGNOSIS — N13.39 OTHER HYDRONEPHROSIS: ICD-10-CM

## 2024-11-12 DIAGNOSIS — R92.30 DENSE BREAST: ICD-10-CM

## 2024-11-12 DIAGNOSIS — M79.644 PAIN OF RIGHT THUMB: ICD-10-CM

## 2024-11-12 PROCEDURE — 99396 PREV VISIT EST AGE 40-64: CPT | Performed by: FAMILY MEDICINE

## 2024-11-12 PROCEDURE — 3078F DIAST BP <80 MM HG: CPT | Performed by: FAMILY MEDICINE

## 2024-11-12 PROCEDURE — 3074F SYST BP LT 130 MM HG: CPT | Performed by: FAMILY MEDICINE

## 2024-11-12 PROCEDURE — 76770 US EXAM ABDO BACK WALL COMP: CPT | Performed by: FAMILY MEDICINE

## 2024-11-12 PROCEDURE — 3008F BODY MASS INDEX DOCD: CPT | Performed by: FAMILY MEDICINE

## 2024-11-12 RX ORDER — HYDROXYZINE HYDROCHLORIDE 25 MG/1
TABLET, FILM COATED ORAL
COMMUNITY
Start: 2024-07-05

## 2024-11-12 RX ORDER — ONDANSETRON 4 MG/1
TABLET, ORALLY DISINTEGRATING ORAL
COMMUNITY
Start: 2024-07-05

## 2024-11-12 RX ORDER — OMEGA-3S/DHA/EPA/FISH OIL/D3 1150-1000
LIQUID (ML) ORAL DAILY
COMMUNITY

## 2024-11-12 NOTE — PROGRESS NOTES
HPI:   Brenda Euceda is a 44 year old female who presents for a complete physical exam.   No concerns today.  Increased urination she was seen ic.   She has not seen nephrology in past. Her creatinine was elevated in past but was back to normal.         Pap 2021  Mammogram  utd  Has gyne:       Wt Readings from Last 6 Encounters:   11/12/24 155 lb 12.8 oz (70.7 kg)   12/04/23 155 lb (70.3 kg)   03/15/23 155 lb (70.3 kg)   08/31/22 153 lb (69.4 kg)   03/01/22 153 lb 6.4 oz (69.6 kg)   02/19/22 150 lb (68 kg)     Body mass index is 28.5 kg/m².     Lab Results   Component Value Date    A1C 5.0 11/04/2024    A1C 5.2 12/04/2023    A1C 5.2 11/11/2021     Lab Results   Component Value Date    CHOLEST 164 11/04/2024    CHOLEST 161 12/04/2023    CHOLEST 159 11/11/2021     Lab Results   Component Value Date    HDL 64 (H) 11/04/2024    HDL 68 (H) 12/04/2023    HDL 60 (H) 11/11/2021     Lab Results   Component Value Date    LDL 88 11/04/2024    LDL 83 12/04/2023    LDL 89 11/11/2021     Lab Results   Component Value Date    AST 26 11/04/2024    AST 28 12/04/2023    AST 28 11/11/2021     Lab Results   Component Value Date    ALT 19 11/04/2024    ALT 33 12/04/2023    ALT 34 11/11/2021           Immunization History  Administered            Date(s) Administered    >=3 YRS FLUZONE OR FLUARIX QUAD PRESERVE FREE SINGLE DOSE (55667) FLU CLINIC                          10/07/2016      Covid-19 Vaccine Moderna 100 mcg/0.5 ml                          04/23/2021 05/20/2021      FLULAVAL 6 months & older 0.5 ml Prefilled syringe (73206)                          01/05/2018  10/21/2020      FLUZONE 6 months and older PFS 0.5 ml (31077)                          10/07/2016  01/05/2018  10/21/2020                            12/04/2023      TDAP                  12/04/2023         Current Outpatient Medications   Medication Sig Dispense Refill    hydrOXYzine 25 MG Oral Tab TAKE 1 TO 2 TABLETS BY MOUTH EVERY 12 HOURS AS NEEDED FOR ALLERGY  SYMPTOMS AS DIRECTED      ondansetron 4 MG Oral Tablet Dispersible ALLOW 1 TABLET TO DISSOLVE ON THE TONGUE EVERY 6 HOURS AS NEEDED FOR NAUSEA OR VOMITING      omega-3 Oral Liquid Take by mouth daily.      PLEXION CLEANSER 9.8-4.8 % External Liquid Wash face twice a day      Scopolamine 1.5mg TD patch 1mg/3days Place 1 patch onto the skin every third day. 4 patch 0    Sulfacetamide Sodium, Acne, 10 % External Lotion APPLY EXTERNALLY TO FACE EVERY MORNING 118 mL 2      Past Medical History:    Abdominal pain    Occasional    Adult acne    Bloating    Chronic    Decorative tattoo    Eczema    Flatulence/gas pain/belching    Chronic    Heartburn    Frequent occasional heart burn >1year    Hx of motion sickness    Indigestion    Stress      Past Surgical History:   Procedure Laterality Date    Benign biopsy right            x2    Colonoscopy      Hysteroscopy,biopsy  2022    D&C, endometrial polypectomy, placement Mirena IUD    Radha biopsy stereo nodule 1 site right (cpt=19081)  ?     benign    Upper gi endoscopy,exam        Family History   Problem Relation Age of Onset    Hypertension Maternal Grandmother     Lipids Maternal Grandmother     Cancer Maternal Grandmother         breast cancer (40s), colon ca (40s)    Breast Cancer Maternal Grandmother 45        approx age     Colon Cancer Maternal Grandmother         Had partial resection in 40s    Crohn's Disease Sister         Sister has a history of IBS    Heart Disease Neg     Stroke Neg     Diabetes Neg     Heart Disorder Neg       Social History:   Social History     Socioeconomic History    Marital status:    Tobacco Use    Smoking status: Former     Current packs/day: 0.00     Average packs/day: 1.5 packs/day for 10.0 years (15.0 ttl pk-yrs)     Types: Cigarettes     Start date: 1998     Quit date: 2008     Years since quittin.0    Smokeless tobacco: Never    Tobacco comments:     Smoker [id number]   Vaping Use    Vaping  status: Never Used   Substance and Sexual Activity    Alcohol use: Yes     Alcohol/week: 1.0 standard drink of alcohol     Types: 1 Glasses of wine per week     Comment: occ    Drug use: No    Sexual activity: Yes     Partners: Male     Birth control/protection: Vasectomy   Other Topics Concern    Caffeine Concern Yes     Comment: 2-3 cups daily     Exercise Yes     Comment: 4-5x weekly     Seat Belt Yes          REVIEW OF SYSTEMS:   GENERAL: feels well otherwise  SKIN: denies any unusual skin lesions  EYES:denies blurred vision or double vision  HEENT: denies nasal congestion, sinus pain or ST  LUNGS: denies shortness of breath  or cough  CARDIOVASCULAR: denies chest pain or palpitations  GI: denies abdominal pain. Denies heartburn. Has regular bowel movements. No blood in stool.  : denies dysuria. No discharge or itching.   MUSCULOSKELETAL: denies back pain  NEURO: denies headaches or dizziness  PSYCHE: denies depression or anxiety    EXAM:   /74   Pulse 56   Temp 97.6 °F (36.4 °C)   Resp 18   Ht 5' 2\" (1.575 m)   Wt 155 lb 12.8 oz (70.7 kg)   LMP 10/24/2024 (Exact Date)   SpO2 99%   BMI 28.50 kg/m²   Body mass index is 28.5 kg/m².   GENERAL: well nourished,in no apparent distress  SKIN: no rashes  HEENT: atraumatic, normocephalic,ears and throat are clear  EYES:PERRLA, conjunctiva are clear  NECK: supple,no adenopathy,no bruits. No thyromegaly  LUNGS: clear to auscultation  CARDIO: RRR without murmur  GI: soft, good BS's,no masses, HSM or tenderness  MUSCULOSKELETAL: back is not tender  EXTREMITIES: no edema  NEURO: Cranial nerves are intact,motor and sensory are grossly intact  PSYCH: mood, thought, behavior and judgement normal    ASSESSMENT AND PLAN:   Brenda Euceda is a 44 year old female who presents for a complete physical exam.     Fasting BW orderedlabs ordered  Refer to urology for elevated creatinine and uti symptoms.  Pt' s weight is Body mass index is 28.5 kg/m²., recommended low  fat/carb diet and aerobic exercise 45 minutes 5 times weekly.          The patient indicates understanding of these issues and agrees to the plan.

## 2024-11-14 ENCOUNTER — TELEPHONE (OUTPATIENT)
Dept: FAMILY MEDICINE CLINIC | Facility: CLINIC | Age: 44
End: 2024-11-14

## 2024-11-22 ENCOUNTER — OFFICE VISIT (OUTPATIENT)
Dept: SURGERY | Facility: CLINIC | Age: 44
End: 2024-11-22
Payer: COMMERCIAL

## 2024-11-22 DIAGNOSIS — R35.0 URINARY FREQUENCY: ICD-10-CM

## 2024-11-22 DIAGNOSIS — N13.30 HYDRONEPHROSIS OF LEFT KIDNEY: Primary | ICD-10-CM

## 2024-11-22 DIAGNOSIS — R82.90 URINE FINDING: ICD-10-CM

## 2024-11-22 DIAGNOSIS — R79.89 ELEVATED SERUM CREATININE: ICD-10-CM

## 2024-11-22 LAB
APPEARANCE: CLEAR
BILIRUBIN: NEGATIVE
GLUCOSE (URINE DIPSTICK): NEGATIVE MG/DL
KETONES (URINE DIPSTICK): NEGATIVE MG/DL
LEUKOCYTES: NEGATIVE
MULTISTIX LOT#: NORMAL NUMERIC
NITRITE, URINE: NEGATIVE
OCCULT BLOOD: NEGATIVE
PH, URINE: 7 (ref 4.5–8)
PROTEIN (URINE DIPSTICK): NEGATIVE MG/DL
SPECIFIC GRAVITY: 1.01 (ref 1–1.03)
URINE-COLOR: YELLOW
UROBILINOGEN,SEMI-QN: 0.2 MG/DL (ref 0–1.9)

## 2024-11-22 PROCEDURE — 99204 OFFICE O/P NEW MOD 45 MIN: CPT | Performed by: PHYSICIAN ASSISTANT

## 2024-11-22 PROCEDURE — 81003 URINALYSIS AUTO W/O SCOPE: CPT | Performed by: PHYSICIAN ASSISTANT

## 2024-11-22 RX ORDER — TAMSULOSIN HYDROCHLORIDE 0.4 MG/1
0.4 CAPSULE ORAL DAILY
Qty: 30 CAPSULE | Refills: 0 | Status: SHIPPED | OUTPATIENT
Start: 2024-11-22 | End: 2024-11-22

## 2024-11-22 RX ORDER — TAMSULOSIN HYDROCHLORIDE 0.4 MG/1
0.4 CAPSULE ORAL
Qty: 90 CAPSULE | Refills: 0 | Status: SHIPPED | OUTPATIENT
Start: 2024-11-22

## 2024-11-22 NOTE — PROGRESS NOTES
[facility], [facility]    Urology Consult Note    History of Present Illness:   Patient is a 44 year old female with hx of GERD, who presents today for consultation from Dr. Correia's office for left hydronephrosis.    Patient presented to the [facility] 24 for low back pain and dysuria. She had been on Bactrim and Macrobid prior to that for suspected UTI. It was recommended she continue macrobid. She returned 24 for increased urinary frequency. UA negative, urine culture negative.     Was seen in follow up with PCP office 24 for ongoing LUTS. RBUS ordered that showed moderate left hydronephrosis. Scr 24 1.09.    Has some mild discomfort left low back, but no pain. No dysuria, gross hematuria. Ongoing urinary frequency with constant urge to void.     No personal or FH of stones  Good water consumption    HISTORY:  Past Medical History:    Abdominal pain    Occasional    Adult acne    Bloating    Chronic    Decorative tattoo    Eczema    Flatulence/gas pain/belching    Chronic    Heartburn    Frequent occasional heart burn >1year    Hx of motion sickness    Indigestion    Stress      Past Surgical History:   Procedure Laterality Date    Benign biopsy right            x2    Colonoscopy      Hysteroscopy,biopsy  2022    D&C, endometrial polypectomy, placement Mirena IUD    Radha biopsy stereo nodule 1 site right (cpt=19081)  ?     benign    Upper gi endoscopy,exam        Family History   Problem Relation Age of Onset    Hypertension Maternal Grandmother     Lipids Maternal Grandmother     Cancer Maternal Grandmother         breast cancer (40s), colon ca (40s)    Breast Cancer Maternal Grandmother 45        approx age     Colon Cancer Maternal Grandmother         Had partial resection in 40s    Crohn's Disease Sister         Sister has a history of IBS    Heart Disease Neg     Stroke Neg     Diabetes Neg     Heart Disorder Neg       Social History:    Social History     Socioeconomic History    Marital status:    Tobacco Use    Smoking status: Former     Current packs/day: 0.00     Average packs/day: 1.5 packs/day for 10.0 years (15.0 ttl pk-yrs)     Types: Cigarettes     Start date: 1998     Quit date: 2008     Years since quittin.0    Smokeless tobacco: Never    Tobacco comments:     Smoker [id number]   Vaping Use    Vaping status: Never Used   Substance and Sexual Activity    Alcohol use: Yes     Alcohol/week: 1.0 standard drink of alcohol     Types: 1 Glasses of wine per week     Comment: occ    Drug use: No    Sexual activity: Yes     Partners: Male     Birth control/protection: Vasectomy   Other Topics Concern    Caffeine Concern Yes     Comment: 2-3 cups daily     Exercise Yes     Comment: 4-5x weekly     Seat Belt Yes        Allergies  Allergies[1]    Review of Systems:   A 10-point review of systems was completed and is negative other than as noted above.    Physical Exam:   LMP 10/24/2024 (Exact Date)     GENERAL APPEARANCE: well developed, well nourished, in no acute distress  NEUROLOGIC: no localizing neurologic signs, alert and oriented x 3, converses appropriately  HEAD: atraumatic, normocephalic  EYES: sclera non-icteric  ORAL CAVITY: mucosa moist  NECK/THYROID: no obvious masses or goiter  LUNGS: non-labored breathing  ABDOMEN: soft, nontender, nondistended  No CVAT  EXTREMITIES: warm, well-perfused. No clubbing, cyanosis or edema.  SKIN: no obvious rashes    Results:     Laboratory Data:  Lab Results   Component Value Date    WBC 3.8 (L) 2024    HGB 14.0 2024    .0 2024     Lab Results   Component Value Date     2024    K 4.7 2024     2024    CO2 27.0 2024    BUN 10 2024    GLU 91 2024    GFRAA 76 2021    AST 26 2024    ALT 19 2024    TP 7.3 2024    ALB 4.5 2024    CA 9.7 2024       Urinalysis Results (last three  years):  Recent Labs     11/07/24  1849 11/09/24  1014 11/22/24  0954   SPECGRAVITY 1.020 1.015 1.010   PHURINE  --   --  7.0   GLUUR Negative Negative  --    NITRITE  --   --  Negative       Urine Culture Results (last three years):  Lab Results   Component Value Date    URINECUL No Growth 2 Days 11/09/2024    URINECUL No Growth at 18-24 hrs. 11/07/2024       Imaging  US KIDNEY/BLADDER (CPT=76770)    Result Date: 11/12/2024  PROCEDURE:  US KIDNEY/BLADDER (CPT=76770)  COMPARISON:  None.  INDICATIONS:  N32.81 Overactive bladder  TECHNIQUE:  Transabdominal gray scale ultrasound imaging of the bilateral kidneys and bladder was performed.  Routine technique was utilized.   PATIENT STATED HISTORY: (As transcribed by Technologist)  Urinary frequency and elevated creatinine.     FINDINGS:   RIGHT KIDNEY MEASUREMENTS:  11.6 cm ECHOGENICITY:  Normal. HYDRONEPHROSIS:  None.  Extrarenal pelvis is noted. CYSTS/STONES/MASSES:  None.  LEFT KIDNEY MEASUREMENTS:  11.0 cm ECHOGENICITY:  Normal. HYDRONEPHROSIS:  Moderate left hydronephrosis CYSTS/STONES/MASSES:  None.  BLADDER:  Normal. OTHER:  Negative.            CONCLUSION:  1. Moderate left hydronephrosis   LOCATION:  [id number]     Dictated by (CST): Tello Grubbs MD on 11/12/2024 at 4:24 PM     Finalized by (CST): Tello Grubbs MD on 11/12/2024 at 4:27 PM       MINH KARON 2D+3D SCREENING BILAT (CPT=77067/57252)    Result Date: 10/24/2024  PROCEDURE:  [facility] KARON 2D+3D SCREENING BILAT (CPT=77067/84704)  COMPARISON:  US MADDIE, US BREAST RIGHT LIMITED ([facility] SAME DAY US)(CPT=76642), 11/21/2017, 2:15 PM.  MG MADDIE, MINH KARON 2D+3D DIAGNOSTIC MINH  BILAT (DDP=69145/31121), 11/21/2017, 2:02 PM.  MG MADDIE, MINH KARON 2D+3D SCREENING BILAT (CPT=77067/80919), 4/19/2023, 7:59 AM.  INDICATIONS:  Z12.31 Breast cancer screening by mammogram  VIEWS OBTAINED:  Routine views of both breasts were obtained.  Standard 2D and additional multiplane thin sections of the breasts were obtained for  the purpose of Tomosynthesis evaluation.  The images were reconstructed and reviewed on the dedicated Tomosynthesis workstation.  BREAST COMPOSITION:   Heterogeneously dense, which may obscure small masses.  FINDINGS: Stable parenchymal pattern both breasts. No suspicious calcifications, spiculated masses or areas of architectural distortion in either breast.               CONCLUSION:  Stable mammogram   BI-RADS CATEGORY:  DIAGNOSTIC CATEGORY 2 - BENIGN FINDING:   RECOMMENDATIONS:  ROUTINE MAMMOGRAM AND CLINICAL EVALUATION IN 12 MONTHS.   Because of breast density, this patient may benefit from supplemental screening with breast MRI, Molecular Breast Imaging (MBI) or bilateral whole breast ultrasound for increased sensitivity for detection of cancer which can be obscured mammographically.   Please contact your ordering provider to discuss supplemental screening options.    A letter explaining the results in lay terms has been sent to the patient.  This exam was evaluated with a computer-aided device.  This patient's information has been entered into a reminder system with a target due date for the next mammogram.   LOCATION:  [place]   Dictated by (CST): Dunia Otto MD on 10/24/2024 at 2:35 PM     Finalized by (CST): Dunia Otto MD on 10/24/2024 at 2:37 PM   [address] [address] [phone]        Impression:     Patient is a 44 year old female  with hx of GERD, who presents today for consultation from Dr. Correia's office for left hydronephrosis.    Reviewed with patient potential etiologies of left hydronephrosis, high suspicion for stone given urinary complaints also. Limitations of ultrasound reviewed and need for CT scan discussed.     Given suspicion for stone will initiation tamsulosin 0.4mg daily, ASE reviewed.     Recommendations:  CT stone. Flomax, strain urine. Pending results will give further recommendations. Future repeat labs to ensure Scr  returned to baseline.     Thank you very much for this consult. Please call if there are any questions or concerns.     Yael Jacinto PA-C  Urology  [facility]    Date: 11/22/2024          [1] No Known Allergies

## 2024-11-23 ENCOUNTER — HOSPITAL ENCOUNTER (OUTPATIENT)
Dept: CT IMAGING | Facility: HOSPITAL | Age: 44
Discharge: HOME | End: 2024-11-23
Attending: PHYSICIAN ASSISTANT
Payer: COMMERCIAL

## 2024-11-23 DIAGNOSIS — R35.0 URINARY FREQUENCY: ICD-10-CM

## 2024-11-23 DIAGNOSIS — N13.30 HYDRONEPHROSIS OF LEFT KIDNEY: ICD-10-CM

## 2024-11-23 PROCEDURE — 74176 CT ABD & PELVIS W/O CONTRAST: CPT | Performed by: PHYSICIAN ASSISTANT

## 2024-12-12 ENCOUNTER — TELEPHONE (OUTPATIENT)
Dept: FAMILY MEDICINE CLINIC | Facility: CLINIC | Age: 44
End: 2024-12-12

## 2024-12-12 NOTE — TELEPHONE ENCOUNTER
Labs and imaging due  Mychart message sent  Future Appointments   Date Time Provider Department Center   12/26/2024 10:00 AM OS CT RM 1 OS CT [place]   1/23/2025  8:00 AM Stephanie Milligan APRN [facility] [place]

## 2024-12-26 ENCOUNTER — HOSPITAL ENCOUNTER (OUTPATIENT)
Dept: CT IMAGING | Age: 44
Discharge: HOME | End: 2024-12-26
Attending: PHYSICIAN ASSISTANT
Payer: COMMERCIAL

## 2024-12-26 ENCOUNTER — MOBILE ENCOUNTER (OUTPATIENT)
Dept: SURGERY | Facility: CLINIC | Age: 44
End: 2024-12-26

## 2024-12-26 DIAGNOSIS — Q62.39 UPJ OBSTRUCTION, CONGENITAL: ICD-10-CM

## 2024-12-26 DIAGNOSIS — N13.5 UPJ OBSTRUCTION, ACQUIRED: ICD-10-CM

## 2024-12-26 DIAGNOSIS — N13.30 HYDRONEPHROSIS OF LEFT KIDNEY: Primary | ICD-10-CM

## 2024-12-26 LAB
CREAT BLD-MCNC: 0.9 MG/DL
EGFRCR SERPLBLD CKD-EPI 2021: 82 ML/MIN/1.73M2 (ref 60–?)

## 2024-12-26 PROCEDURE — 74177 CT ABD & PELVIS W/CONTRAST: CPT | Performed by: PHYSICIAN ASSISTANT

## 2024-12-26 PROCEDURE — 82565 ASSAY OF CREATININE: CPT

## 2024-12-26 RX ORDER — IOHEXOL 350 MG/ML
100 INJECTION, SOLUTION INTRAVENOUS
Status: COMPLETED | OUTPATIENT
Start: 2024-12-26 | End: 2024-12-26

## 2024-12-26 RX ADMIN — IOHEXOL 100 ML: 350 INJECTION, SOLUTION INTRAVENOUS at 10:38:00

## 2025-01-02 ENCOUNTER — LAB ENCOUNTER (OUTPATIENT)
Dept: LAB | Age: 45
End: 2025-01-02
Attending: FAMILY MEDICINE
Payer: COMMERCIAL

## 2025-01-02 ENCOUNTER — HOSPITAL ENCOUNTER (OUTPATIENT)
Dept: GENERAL RADIOLOGY | Age: 45
Discharge: HOME | End: 2025-01-02
Attending: FAMILY MEDICINE
Payer: COMMERCIAL

## 2025-01-02 DIAGNOSIS — R79.89 ELEVATED SERUM CREATININE: ICD-10-CM

## 2025-01-02 DIAGNOSIS — N32.81 OVERACTIVE BLADDER: ICD-10-CM

## 2025-01-02 DIAGNOSIS — M79.644 PAIN OF RIGHT THUMB: ICD-10-CM

## 2025-01-02 LAB
ALBUMIN SERPL-MCNC: 4.3 G/DL (ref 3.2–4.8)
ALBUMIN/GLOB SERPL: 1.3 {RATIO} (ref 1–2)
ALP LIVER SERPL-CCNC: 52 U/L
ALT SERPL-CCNC: 30 U/L
ANION GAP SERPL CALC-SCNC: 2 MMOL/L (ref 0–18)
AST SERPL-CCNC: 26 U/L (ref ?–34)
BILIRUB SERPL-MCNC: 0.4 MG/DL (ref 0.3–1.2)
BUN BLD-MCNC: 16 MG/DL (ref 9–23)
CALCIUM BLD-MCNC: 9.6 MG/DL (ref 8.7–10.4)
CHLORIDE SERPL-SCNC: 105 MMOL/L (ref 98–112)
CO2 SERPL-SCNC: 28 MMOL/L (ref 21–32)
CREAT BLD-MCNC: 0.85 MG/DL
EGFRCR SERPLBLD CKD-EPI 2021: 88 ML/MIN/1.73M2 (ref 60–?)
ERYTHROCYTE [DISTWIDTH] IN BLOOD BY AUTOMATED COUNT: 13.1 %
FASTING STATUS PATIENT QL REPORTED: NO
GLOBULIN PLAS-MCNC: 3.4 G/DL (ref 2–3.5)
GLUCOSE BLD-MCNC: 104 MG/DL (ref 70–99)
HCT VFR BLD AUTO: 41.7 %
HGB BLD-MCNC: 13.8 G/DL
MCH RBC QN AUTO: 29.6 PG (ref 26–34)
MCHC RBC AUTO-ENTMCNC: 33.1 G/DL (ref 31–37)
MCV RBC AUTO: 89.3 FL
OSMOLALITY SERPL CALC.SUM OF ELEC: 281 MOSM/KG (ref 275–295)
PLATELET # BLD AUTO: 245 10(3)UL (ref 150–450)
POTASSIUM SERPL-SCNC: 4 MMOL/L (ref 3.5–5.1)
PROT SERPL-MCNC: 7.7 G/DL (ref 5.7–8.2)
RBC # BLD AUTO: 4.67 X10(6)UL
SODIUM SERPL-SCNC: 135 MMOL/L (ref 136–145)
WBC # BLD AUTO: 5 X10(3) UL (ref 4–11)

## 2025-01-02 PROCEDURE — 80053 COMPREHEN METABOLIC PANEL: CPT

## 2025-01-02 PROCEDURE — 73630 X-RAY EXAM OF FOOT: CPT | Performed by: FAMILY MEDICINE

## 2025-01-02 PROCEDURE — 36415 COLL VENOUS BLD VENIPUNCTURE: CPT

## 2025-01-02 PROCEDURE — 85027 COMPLETE CBC AUTOMATED: CPT

## 2025-01-13 ENCOUNTER — TELEPHONE (OUTPATIENT)
Dept: FAMILY MEDICINE CLINIC | Facility: CLINIC | Age: 45
End: 2025-01-13

## 2025-01-13 NOTE — TELEPHONE ENCOUNTER
Imaging due  Mychart message sent  Future Appointments   Date Time Provider Department Center   1/23/2025  8:00 AM Stephanie Milligan APRN [facility] [place]

## 2025-02-10 ENCOUNTER — HOSPITAL ENCOUNTER (OUTPATIENT)
Dept: NUCLEAR MEDICINE | Facility: HOSPITAL | Age: 45
Discharge: HOME | End: 2025-02-10
Attending: PHYSICIAN ASSISTANT
Payer: COMMERCIAL

## 2025-02-10 DIAGNOSIS — N13.30 HYDRONEPHROSIS OF LEFT KIDNEY: ICD-10-CM

## 2025-02-10 DIAGNOSIS — Q62.39 UPJ OBSTRUCTION, CONGENITAL: ICD-10-CM

## 2025-02-10 PROCEDURE — 78708 K FLOW/FUNCT IMAGE W/DRUG: CPT | Performed by: PHYSICIAN ASSISTANT

## 2025-02-10 RX ORDER — FUROSEMIDE 10 MG/ML
40 INJECTION INTRAMUSCULAR; INTRAVENOUS ONCE
Status: COMPLETED | OUTPATIENT
Start: 2025-02-10 | End: 2025-02-10

## 2025-02-10 RX ORDER — FUROSEMIDE 10 MG/ML
INJECTION INTRAMUSCULAR; INTRAVENOUS
Status: COMPLETED
Start: 2025-02-10 | End: 2025-02-10

## 2025-02-10 RX ADMIN — FUROSEMIDE 40 MG: 10 INJECTION INTRAMUSCULAR; INTRAVENOUS at 14:15:00

## 2025-02-18 ENCOUNTER — TELEPHONE (OUTPATIENT)
Dept: SURGERY | Facility: CLINIC | Age: 45
End: 2025-02-18

## 2025-03-30 NOTE — PROGRESS NOTES
Urology Clinic Note    Primary Care Provider:  Che Correia MD     Chief Complaint:   Hydronephrosis     HPI:        Patient is a 44 year old female with hx of GERD, who presents today for follow up of left sided hydronephrosis. Patient is a nurse.     Patient presented to the [facility] 11/7/24 for low back pain and dysuria. She had been on Bactrim and Macrobid prior to that for suspected UTI. It was recommended she continue macrobid. She returned 11/9/24 for increased urinary frequency. UA negative, urine culture negative.   Was seen in follow up with PCP office 11/12/24 for ongoing LUTS. RBUS ordered that showed moderate left hydronephrosis. Scr 11/4/24 1.09.  Patient was seen by Yael Palencia PA-C for evaluation in November 2024.  He was doing well at that time.  She had no gross hematuria.  She had some mild urinary symptoms.  She reported no family history of stones.  Good water intake.  No pain related to water intake.  Of note, patient had been on hydroxyzine per her PCP for possible IC.     At that time, decision was made to obtain a CT scan.    CT 12/26;   Impression  CONCLUSION:  Chronic dilatation of the left extrarenal collecting system without dilatation of the renal calices is noted.  This may be due to a chronic UPJ obstruction.     We then obtained a NM renal lasix scan: on 2/10/25;   FUNCTION:  55 % contribution to total renal function from left kidney, 45 % from right kidney.  T 1/2:  Right 11 minutes; Left 13.4 minutes  CONCLUSION:  As noted on the CT from 12/26/2024, there is moderate left-sided hydronephrosis.  However, there is normal excretion and washout after IV infusion of Lasix and therefore there is no significant or mechanical obstruction.     My review, diuretic T half on the left side is 4.5, right side 6.5.  Curves appear appropriate with prompt drainage of contrast after Lasix.    Most contrast from both kidneys had been excreted before Lasix was even given.    Patient is doing well  today.  No flank pain.  Her urinary symptoms have mostly resolved above.  She has no gross hematuria.  No recent history of UTI.  Last 2 urine cultures in our system from November were negative.  She has no symptoms related to fluid intake.      PSA:  No results found for: \"PSA\", \"PERCENTPSA\", \"PSAS\", \"PSAULTRA\", \"QPSA\", \"PSATOT\", \"TOTPSADX\", \"TOTPSASCREEN\"     History:     Past Medical History:    Abdominal pain    Occasional    Adult acne    Bloating    Chronic    Decorative tattoo    Eczema    Flatulence/gas pain/belching    Chronic    Heartburn    Frequent occasional heart burn >1year    Hx of motion sickness    Indigestion    Stress       Past Surgical History:   Procedure Laterality Date    Benign biopsy right            x2    Colonoscopy      Hysteroscopy,biopsy  2022    D&C, endometrial polypectomy, placement Mirena IUD    Radha biopsy stereo nodule 1 site right (cpt=19081)  ?     benign    Upper gi endoscopy,exam         Family History   Problem Relation Age of Onset    Hypertension Maternal Grandmother     Lipids Maternal Grandmother     Cancer Maternal Grandmother         breast cancer (40s), colon ca (40s)    Breast Cancer Maternal Grandmother 45        approx age     Colon Cancer Maternal Grandmother         Had partial resection in 40s    Crohn's Disease Sister         Sister has a history of IBS    Heart Disease Neg     Stroke Neg     Diabetes Neg     Heart Disorder Neg        Social History     Socioeconomic History    Marital status:    Tobacco Use    Smoking status: Former     Current packs/day: 0.00     Average packs/day: 1.5 packs/day for 10.0 years (15.0 ttl pk-yrs)     Types: Cigarettes     Start date: 1998     Quit date: 2008     Years since quittin.4    Smokeless tobacco: Never    Tobacco comments:     Smoker [id number]   Vaping Use    Vaping status: Never Used   Substance and Sexual Activity    Alcohol use: Yes     Alcohol/week: 1.0 standard drink of  alcohol     Types: 1 Glasses of wine per week     Comment: occ    Drug use: No    Sexual activity: Yes     Partners: Male     Birth control/protection: Vasectomy   Other Topics Concern    Caffeine Concern Yes     Comment: 2-3 cups daily     Exercise Yes     Comment: 4-5x weekly     Seat Belt Yes       Medications (Active prior to today's visit):  Current Outpatient Medications   Medication Sig Dispense Refill    TAMSULOSIN 0.4 MG Oral Cap TAKE 1 CAPSULE(0.4 MG) BY MOUTH DAILY 30 MINUTES AFTER THE SAME MEAL 90 capsule 0    hydrOXYzine 25 MG Oral Tab TAKE 1 TO 2 TABLETS BY MOUTH EVERY 12 HOURS AS NEEDED FOR ALLERGY SYMPTOMS AS DIRECTED      ondansetron 4 MG Oral Tablet Dispersible ALLOW 1 TABLET TO DISSOLVE ON THE TONGUE EVERY 6 HOURS AS NEEDED FOR NAUSEA OR VOMITING      omega-3 Oral Liquid Take by mouth daily.      PLEXION CLEANSER 9.8-4.8 % External Liquid Wash face twice a day      Scopolamine 1.5mg TD patch 1mg/3days Place 1 patch onto the skin every third day. 4 patch 0    Sulfacetamide Sodium, Acne, 10 % External Lotion APPLY EXTERNALLY TO FACE EVERY MORNING 118 mL 2       Allergies:  Allergies[1]    Review of Systems:   A comprehensive 10-point review of systems was completed.  Pertinent positives and negatives are noted in the the HPI.    Physical Exam:   CONSTITUTIONAL: Well developed, well nourished, in no acute distress  ABDOMEN: Soft, non-tender, non-distended  GENITOURINARY: Normal phallus, orthotopic meatus, normal bilateral testicles             Assessment & Plan:   Abnormal kidney imaging  We discussed the findings on imaging of her left-sided chronic hydronephrosis.  We discussed that her Lasix renal scan shows good symmetric function of both kidneys.  There does seem to be some anatomic distention of the left collecting system, however patient does not seem to be obstructed based on her Lasix renal scan curves or T half values.  She appears completely asymptomatic from this at this time.  We  discussed options for further investigation with ureteroscopy versus ongoing observation with a repeat ultrasound in a few months.  She is well-informed of her options, we will plan for an ultrasound in about 6 months.  I encourage patient to let me know if she has any changes in the interval.    In total, 40 minutes were spent on this patient encounter (including chart review, patient history, physical, and counseling, documentation, and communication).    Orville Cm MD  Staff Urologist  [facility]  Office: [phone]         [1] No Known Allergies

## 2025-03-31 ENCOUNTER — OFFICE VISIT (OUTPATIENT)
Dept: SURGERY | Facility: CLINIC | Age: 45
End: 2025-03-31
Payer: COMMERCIAL

## 2025-03-31 DIAGNOSIS — N13.30 HYDRONEPHROSIS OF LEFT KIDNEY: Primary | ICD-10-CM

## 2025-03-31 LAB
APPEARANCE: CLEAR
BILIRUBIN: NEGATIVE
GLUCOSE (URINE DIPSTICK): NEGATIVE MG/DL
KETONES (URINE DIPSTICK): NEGATIVE MG/DL
LEUKOCYTES: NEGATIVE
MULTISTIX LOT#: NORMAL NUMERIC
NITRITE, URINE: NEGATIVE
OCCULT BLOOD: NEGATIVE
PH, URINE: 6.5 (ref 4.5–8)
PROTEIN (URINE DIPSTICK): NEGATIVE MG/DL
SPECIFIC GRAVITY: 1.01 (ref 1–1.03)
URINE-COLOR: YELLOW
UROBILINOGEN,SEMI-QN: 0.2 MG/DL (ref 0–1.9)

## 2025-03-31 PROCEDURE — G2211 COMPLEX E/M VISIT ADD ON: HCPCS | Performed by: UROLOGY

## 2025-03-31 PROCEDURE — 99215 OFFICE O/P EST HI 40 MIN: CPT | Performed by: UROLOGY

## 2025-03-31 PROCEDURE — 81003 URINALYSIS AUTO W/O SCOPE: CPT | Performed by: UROLOGY

## 2025-04-29 ENCOUNTER — HOSPITAL ENCOUNTER (OUTPATIENT)
Dept: ULTRASOUND IMAGING | Age: 45
Discharge: HOME | End: 2025-04-29
Attending: FAMILY MEDICINE
Payer: COMMERCIAL

## 2025-04-29 DIAGNOSIS — R92.30 DENSE BREAST: ICD-10-CM

## 2025-04-29 PROCEDURE — 76641 ULTRASOUND BREAST COMPLETE: CPT | Performed by: FAMILY MEDICINE

## 2025-08-29 ENCOUNTER — PATIENT MESSAGE (OUTPATIENT)
Dept: FAMILY MEDICINE CLINIC | Facility: CLINIC | Age: 45
End: 2025-08-29

## 2025-08-29 DIAGNOSIS — L70.0 ACNE VULGARIS: Primary | ICD-10-CM

## (undated) DIAGNOSIS — N92.3 INTERMENSTRUAL BLEEDING: Primary | ICD-10-CM

## (undated) DEVICE — SOL  .9 3000ML

## (undated) DEVICE — SPECIMEN SOCK - STANDARD: Brand: MEDI-VAC

## (undated) DEVICE — OUTFLOW HYSTER S&N

## (undated) DEVICE — INFLOWHYSTER S&N

## (undated) DEVICE — MEDI-VAC NON-CONDUCTIVE SUCTION TUBING: Brand: CARDINAL HEALTH

## (undated) DEVICE — STERILE POLYISOPRENE POWDER-FREE SURGICAL GLOVES: Brand: PROTEXIS

## (undated) DEVICE — 2000CC GUARDIAN II: Brand: GUARDIAN

## (undated) DEVICE — SCD SLEEVE KNEE HI BLEND

## (undated) DEVICE — SOL  .9 1000ML BTL

## (undated) DEVICE — GYN CDS: Brand: MEDLINE INDUSTRIES, INC.

## (undated) DEVICE — SEAL TRUCLEAR  HYSTERSCOP